# Patient Record
Sex: FEMALE | Race: WHITE | NOT HISPANIC OR LATINO | Employment: OTHER | URBAN - METROPOLITAN AREA
[De-identification: names, ages, dates, MRNs, and addresses within clinical notes are randomized per-mention and may not be internally consistent; named-entity substitution may affect disease eponyms.]

---

## 2024-06-21 ENCOUNTER — HOSPITAL ENCOUNTER (OUTPATIENT)
Dept: GENERAL RADIOLOGY | Facility: HOSPITAL | Age: 79
Discharge: HOME | End: 2024-06-21
Payer: MEDICARE

## 2024-06-21 ENCOUNTER — PRE-ADMISSION TESTING (OUTPATIENT)
Dept: PREADMISSION TESTING | Facility: HOSPITAL | Age: 79
End: 2024-06-21
Payer: MEDICARE

## 2024-06-21 VITALS
HEIGHT: 67 IN | HEART RATE: 79 BPM | WEIGHT: 191.8 LBS | RESPIRATION RATE: 16 BRPM | BODY MASS INDEX: 30.1 KG/M2 | OXYGEN SATURATION: 94 % | TEMPERATURE: 98.2 F | SYSTOLIC BLOOD PRESSURE: 154 MMHG | DIASTOLIC BLOOD PRESSURE: 80 MMHG

## 2024-06-21 LAB
ALBUMIN SERPL-MCNC: 4.2 G/DL (ref 3.5–5.2)
ALBUMIN/GLOB SERPL: 1.4 G/DL
ALP SERPL-CCNC: 92 U/L (ref 39–117)
ALT SERPL W P-5'-P-CCNC: 16 U/L (ref 1–33)
ANION GAP SERPL CALCULATED.3IONS-SCNC: 12.8 MMOL/L (ref 5–15)
AST SERPL-CCNC: 18 U/L (ref 1–32)
BACTERIA UR QL AUTO: ABNORMAL /HPF
BILIRUB SERPL-MCNC: 0.5 MG/DL (ref 0–1.2)
BILIRUB UR QL STRIP: NEGATIVE
BUN SERPL-MCNC: 22 MG/DL (ref 8–23)
BUN/CREAT SERPL: 24.4 (ref 7–25)
CALCIUM SPEC-SCNC: 9.5 MG/DL (ref 8.6–10.5)
CHLORIDE SERPL-SCNC: 107 MMOL/L (ref 98–107)
CLARITY UR: CLEAR
CO2 SERPL-SCNC: 25.2 MMOL/L (ref 22–29)
COLOR UR: YELLOW
CREAT SERPL-MCNC: 0.9 MG/DL (ref 0.57–1)
DEPRECATED RDW RBC AUTO: 44.2 FL (ref 37–54)
EGFRCR SERPLBLD CKD-EPI 2021: 65.2 ML/MIN/1.73
ERYTHROCYTE [DISTWIDTH] IN BLOOD BY AUTOMATED COUNT: 13.9 % (ref 12.3–15.4)
GLOBULIN UR ELPH-MCNC: 3 GM/DL
GLUCOSE SERPL-MCNC: 126 MG/DL (ref 65–99)
GLUCOSE UR STRIP-MCNC: NEGATIVE MG/DL
HBA1C MFR BLD: 6.5 % (ref 4.8–5.6)
HCT VFR BLD AUTO: 37.3 % (ref 34–46.6)
HGB BLD-MCNC: 12.2 G/DL (ref 12–15.9)
HGB UR QL STRIP.AUTO: ABNORMAL
HYALINE CASTS UR QL AUTO: ABNORMAL /LPF
INR PPP: 1.07 (ref 0.9–1.1)
KETONES UR QL STRIP: NEGATIVE
LEUKOCYTE ESTERASE UR QL STRIP.AUTO: ABNORMAL
MCH RBC QN AUTO: 28.6 PG (ref 26.6–33)
MCHC RBC AUTO-ENTMCNC: 32.7 G/DL (ref 31.5–35.7)
MCV RBC AUTO: 87.6 FL (ref 79–97)
NITRITE UR QL STRIP: NEGATIVE
PH UR STRIP.AUTO: 5.5 [PH] (ref 5–8)
PLATELET # BLD AUTO: 171 10*3/MM3 (ref 140–450)
PMV BLD AUTO: 9.7 FL (ref 6–12)
POTASSIUM SERPL-SCNC: 3.6 MMOL/L (ref 3.5–5.2)
PROT SERPL-MCNC: 7.2 G/DL (ref 6–8.5)
PROT UR QL STRIP: ABNORMAL
PROTHROMBIN TIME: 14.1 SECONDS (ref 11.7–14.2)
QT INTERVAL: 402 MS
QTC INTERVAL: 415 MS
RBC # BLD AUTO: 4.26 10*6/MM3 (ref 3.77–5.28)
RBC # UR STRIP: ABNORMAL /HPF
REF LAB TEST METHOD: ABNORMAL
SODIUM SERPL-SCNC: 145 MMOL/L (ref 136–145)
SP GR UR STRIP: 1.02 (ref 1–1.03)
SQUAMOUS #/AREA URNS HPF: ABNORMAL /HPF
UROBILINOGEN UR QL STRIP: ABNORMAL
WBC # UR STRIP: ABNORMAL /HPF
WBC NRBC COR # BLD AUTO: 5.33 10*3/MM3 (ref 3.4–10.8)

## 2024-06-21 PROCEDURE — 85610 PROTHROMBIN TIME: CPT

## 2024-06-21 PROCEDURE — 80053 COMPREHEN METABOLIC PANEL: CPT

## 2024-06-21 PROCEDURE — 73502 X-RAY EXAM HIP UNI 2-3 VIEWS: CPT

## 2024-06-21 PROCEDURE — 85027 COMPLETE CBC AUTOMATED: CPT

## 2024-06-21 PROCEDURE — 71046 X-RAY EXAM CHEST 2 VIEWS: CPT

## 2024-06-21 PROCEDURE — 36415 COLL VENOUS BLD VENIPUNCTURE: CPT

## 2024-06-21 PROCEDURE — 81001 URINALYSIS AUTO W/SCOPE: CPT

## 2024-06-21 PROCEDURE — 93010 ELECTROCARDIOGRAM REPORT: CPT | Performed by: INTERNAL MEDICINE

## 2024-06-21 PROCEDURE — 83036 HEMOGLOBIN GLYCOSYLATED A1C: CPT

## 2024-06-21 PROCEDURE — 93005 ELECTROCARDIOGRAM TRACING: CPT

## 2024-06-21 RX ORDER — GABAPENTIN 300 MG/1
300 CAPSULE ORAL
Status: ON HOLD | COMMUNITY
Start: 2024-03-07 | End: 2024-06-24

## 2024-06-21 RX ORDER — HYDROCODONE BITARTRATE AND ACETAMINOPHEN 5; 325 MG/1; MG/1
1 TABLET ORAL EVERY 6 HOURS PRN
COMMUNITY
Start: 2024-06-12 | End: 2024-06-27 | Stop reason: HOSPADM

## 2024-06-21 RX ORDER — ROSUVASTATIN CALCIUM 40 MG/1
1 TABLET, COATED ORAL DAILY
COMMUNITY

## 2024-06-21 RX ORDER — IBUPROFEN 600 MG/1
TABLET ORAL
COMMUNITY

## 2024-06-21 RX ORDER — ROPINIROLE 1 MG/1
TABLET, FILM COATED ORAL
COMMUNITY

## 2024-06-21 NOTE — DISCHARGE INSTRUCTIONS
Take the following medications the morning of surgery: NONE    STOP HERBAL REMEDIES, VITAMINS, SUPPLEMENTS,  STOP NSAID MEDICATIONS SUCH AS ALEVE, ASPIRIN, IBUPROFEN, MOTRIN AS SURGEON REQUESTS.  TYLENOL IS OKAY TO TAKE.    BRING A LIST OF CURRENT MEDICATIONS    ARRIVE TO ENTRANCE C.      If you are on prescription narcotic pain medication to control your pain you may also take that medication the morning of surgery.    General Instructions:  Do not eat solid food after midnight the night before surgery.  You may drink clear liquids day of surgery but must stop at least one hour before your hospital arrival time.  It is beneficial for you to have a clear drink that contains carbohydrates the day of surgery.  We suggest a 12 to 20 ounce bottle of Gatorade or Powerade for non-diabetic patients or a 12 to 20 ounce bottle of G2 or Powerade Zero for diabetic patients. (Pediatric patients, are not advised to drink a 12 to 20 ounce carbohydrate drink)    Clear liquids are liquids you can see through.  Nothing red in color.     Plain water                               Sports drinks  Sodas                                   Gelatin (Jell-O)  Fruit juices without pulp such as white grape juice and apple juice  Popsicles that contain no fruit or yogurt  Tea or coffee (no cream or milk added)  Gatorade / Powerade  G2 / Powerade Zero    Patients who avoid smoking, chewing tobacco and alcohol for 4 weeks prior to surgery have a reduced risk of post-operative complications.  Quit smoking as many days before surgery as you can.  Do not smoke, use chewing tobacco or drink alcohol the day of surgery.   If applicable bring your C-PAP/ BI-PAP machine in with you to preop day of surgery.  Bring any papers given to you in the doctor’s office.  Wear clean comfortable clothes.  Do not wear contact lenses, false eyelashes or make-up.  Bring a case for your glasses.   Bring crutches or walker if applicable.  Remove all piercings.  Leave  jewelry and any other valuables at home.  Hair extensions with metal clips must be removed prior to surgery.  The Pre-Admission Testing nurse will instruct you to bring medications if unable to obtain an accurate list in Pre-Admission Testing.          Preventing a Surgical Site Infection:  For 2 to 3 days before surgery, avoid shaving with a razor because the razor can irritate skin and make it easier to develop an infection.    Any areas of open skin can increase the risk of a post-operative wound infection by allowing bacteria to enter and travel throughout the body.  Notify your surgeon if you have any skin wounds / rashes even if it is not near the expected surgical site.  The area will need assessed to determine if surgery should be delayed until it is healed.  The night prior to surgery shower using a fresh bar of anti-bacterial soap (such as Dial) and clean washcloth.  Sleep in a clean bed with clean clothing.  Do not allow pets to sleep with you.  Shower on the morning of surgery using a fresh bar of anti-bacterial soap (such as Dial) and clean washcloth.  Dry with a clean towel and dress in clean clothing.    CHLORHEXIDINE CLOTH INSTRUCTIONS  The morning of surgery follow these instructions using the Chlorhexidine cloths you've been given.  These steps reduce bacteria on the body.  Do not use the cloths near your eyes, ears mouth, genitalia or on open wounds.  Throw the cloths away after use but do not try to flush them down a toilet.      Open and remove one cloth at a time from the package.    Leave the cloth unfolded and begin the bathing.  Massage the skin with the cloths using gentle pressure to remove bacteria.  Do not scrub harshly.   Follow the steps below with one 2% CHG cloth per area (6 total cloths).  One cloth for neck, shoulders and chest.  One cloth for both arms, hands, fingers and underarms (do underarms last).  One cloth for the abdomen followed by groin.  One cloth for right leg and foot  including between the toes.  One cloth for left leg and foot including between the toes.  The last cloth is to be used for the back of the neck, back and buttocks.    Allow the CHG to air dry 3 minutes on the skin which will give it time to work and decrease the chance of irritation.  The skin may feel sticky until it is dry.  Do not rinse with water or any other liquid or you will lose the beneficial effects of the CHG.  If mild skin irritation occurs, do rinse the skin to remove the CHG.  Report this to the nurse at time of admission.  Do not apply lotions, creams, ointments, deodorants or perfumes after using the clothes. Dress in clean clothes before coming to the hospital.  Ask your surgeon if you will be receiving antibiotics prior to surgery.  Make sure you, your family, and all healthcare providers clean their hands with soap and water or an alcohol based hand  before caring for you or your wound.    Day of surgery:  Your arrival time is approximately two hours before your scheduled surgery time.  Upon arrival, a Pre-op nurse and Anesthesiologist will review your health history, obtain vital signs, and answer questions you may have.  The only belongings needed at this time will be a list of your home medications and if applicable your C-PAP/BI-PAP machine.  A Pre-op nurse will start an IV and you may receive medication in preparation for surgery, including something to help you relax.     Please be aware that surgery does come with discomfort.  We want to make every effort to control your discomfort so please discuss any uncontrolled symptoms with your nurse.   Your doctor will most likely have prescribed pain medications.      If you are going home after surgery you will receive individualized written care instructions before being discharged.  A responsible adult must drive you to and from the hospital on the day of your surgery and ideally stay with you through the night.   .  Discharge  prescriptions can be filled by the hospital pharmacy during regular pharmacy hours.  If you are having surgery late in the day/evening your prescription may be e-prescribed to your pharmacy.  Please verify your pharmacy hours or chose a 24 hour pharmacy to avoid not having access to your prescription because your pharmacy has closed for the day.    If you are staying overnight following surgery, you will be transported to your hospital room following the recovery period.  [facility] has all private rooms.    If you have any questions please call Pre-Admission Testing at [phone].  Deductibles and co-payments are collected on the day of service. Please be prepared to pay the required co-pay, deductible or deposit on the day of service as defined by your plan.    Call your surgeon immediately if you experience any of the following symptoms:  Sore Throat  Shortness of Breath or difficulty breathing  Cough  Chills  Body soreness or muscle pain  Headache  Fever  New loss of taste or smell  Do not arrive for your surgery ill.  Your procedure will need to be rescheduled to another time.  You will need to call your physician before the day of surgery to avoid any unnecessary exposure to hospital staff as well as other patients.

## 2024-06-24 ENCOUNTER — APPOINTMENT (OUTPATIENT)
Dept: GENERAL RADIOLOGY | Facility: HOSPITAL | Age: 79
End: 2024-06-24
Payer: MEDICARE

## 2024-06-24 ENCOUNTER — ANESTHESIA EVENT (OUTPATIENT)
Dept: PERIOP | Facility: HOSPITAL | Age: 79
End: 2024-06-24
Payer: MEDICARE

## 2024-06-24 ENCOUNTER — HOSPITAL ENCOUNTER (OUTPATIENT)
Facility: HOSPITAL | Age: 79
Setting detail: OBSERVATION
Discharge: HOME | End: 2024-06-27
Attending: ORTHOPAEDIC SURGERY | Admitting: ORTHOPAEDIC SURGERY
Payer: MEDICARE

## 2024-06-24 ENCOUNTER — ANESTHESIA (OUTPATIENT)
Dept: PERIOP | Facility: HOSPITAL | Age: 79
End: 2024-06-24
Payer: MEDICARE

## 2024-06-24 DIAGNOSIS — Z96.641 STATUS POST RIGHT HIP REPLACEMENT: Primary | ICD-10-CM

## 2024-06-24 PROBLEM — Z96.649 HIP JOINT REPLACEMENT STATUS: Status: ACTIVE | Noted: 2024-06-24

## 2024-06-24 LAB
ANION GAP SERPL CALCULATED.3IONS-SCNC: 13.8 MMOL/L (ref 5–15)
BUN SERPL-MCNC: 21 MG/DL (ref 8–23)
BUN/CREAT SERPL: 25.3 (ref 7–25)
CALCIUM SPEC-SCNC: 8.9 MG/DL (ref 8.6–10.5)
CHLORIDE SERPL-SCNC: 104 MMOL/L (ref 98–107)
CO2 SERPL-SCNC: 22.2 MMOL/L (ref 22–29)
CREAT SERPL-MCNC: 0.83 MG/DL (ref 0.57–1)
EGFRCR SERPLBLD CKD-EPI 2021: 71.8 ML/MIN/1.73
GLUCOSE SERPL-MCNC: 156 MG/DL (ref 65–99)
HCT VFR BLD AUTO: 34.4 % (ref 34–46.6)
HGB BLD-MCNC: 11.2 G/DL (ref 12–15.9)
POTASSIUM SERPL-SCNC: 4 MMOL/L (ref 3.5–5.2)
SODIUM SERPL-SCNC: 140 MMOL/L (ref 136–145)

## 2024-06-24 PROCEDURE — 25010000002 DEXAMETHASONE SODIUM PHOSPHATE 20 MG/5ML SOLUTION: Performed by: NURSE ANESTHETIST, CERTIFIED REGISTERED

## 2024-06-24 PROCEDURE — 25010000002 VANCOMYCIN 1 G RECONSTITUTED SOLUTION: Performed by: ORTHOPAEDIC SURGERY

## 2024-06-24 PROCEDURE — 25010000002 CLONIDINE PER 1 MG: Performed by: ORTHOPAEDIC SURGERY

## 2024-06-24 PROCEDURE — 25010000002 PROPOFOL 200 MG/20ML EMULSION: Performed by: NURSE ANESTHETIST, CERTIFIED REGISTERED

## 2024-06-24 PROCEDURE — A9270 NON-COVERED ITEM OR SERVICE: HCPCS | Performed by: ORTHOPAEDIC SURGERY

## 2024-06-24 PROCEDURE — 25810000003 LACTATED RINGERS PER 1000 ML: Performed by: ANESTHESIOLOGY

## 2024-06-24 PROCEDURE — C1776 JOINT DEVICE (IMPLANTABLE): HCPCS | Performed by: ORTHOPAEDIC SURGERY

## 2024-06-24 PROCEDURE — 25010000002 EPINEPHRINE 1 MG/ML SOLUTION 30 ML VIAL: Performed by: ORTHOPAEDIC SURGERY

## 2024-06-24 PROCEDURE — G0378 HOSPITAL OBSERVATION PER HR: HCPCS

## 2024-06-24 PROCEDURE — 25010000002 FENTANYL CITRATE (PF) 100 MCG/2ML SOLUTION: Performed by: NURSE ANESTHETIST, CERTIFIED REGISTERED

## 2024-06-24 PROCEDURE — 25010000002 ESMOLOL 100 MG/10ML SOLUTION: Performed by: NURSE ANESTHETIST, CERTIFIED REGISTERED

## 2024-06-24 PROCEDURE — 72170 X-RAY EXAM OF PELVIS: CPT

## 2024-06-24 PROCEDURE — 25010000002 ROPIVACAINE PER 1 MG: Performed by: ORTHOPAEDIC SURGERY

## 2024-06-24 PROCEDURE — 25010000002 ONDANSETRON PER 1 MG: Performed by: NURSE ANESTHETIST, CERTIFIED REGISTERED

## 2024-06-24 PROCEDURE — 25010000002 HYDROMORPHONE PER 4 MG: Performed by: NURSE ANESTHETIST, CERTIFIED REGISTERED

## 2024-06-24 PROCEDURE — 85014 HEMATOCRIT: CPT | Performed by: ORTHOPAEDIC SURGERY

## 2024-06-24 PROCEDURE — 80048 BASIC METABOLIC PNL TOTAL CA: CPT | Performed by: ORTHOPAEDIC SURGERY

## 2024-06-24 PROCEDURE — 25010000002 SUGAMMADEX 200 MG/2ML SOLUTION: Performed by: NURSE ANESTHETIST, CERTIFIED REGISTERED

## 2024-06-24 PROCEDURE — 25010000002 KETOROLAC TROMETHAMINE PER 15 MG: Performed by: ORTHOPAEDIC SURGERY

## 2024-06-24 PROCEDURE — 85018 HEMOGLOBIN: CPT | Performed by: ORTHOPAEDIC SURGERY

## 2024-06-24 PROCEDURE — 25010000002 MAGNESIUM SULFATE PER 500 MG OF MAGNESIUM: Performed by: NURSE ANESTHETIST, CERTIFIED REGISTERED

## 2024-06-24 PROCEDURE — 63710000001 ASPIRIN 81 MG TABLET DELAYED-RELEASE: Performed by: ORTHOPAEDIC SURGERY

## 2024-06-24 PROCEDURE — C1713 ANCHOR/SCREW BN/BN,TIS/BN: HCPCS | Performed by: ORTHOPAEDIC SURGERY

## 2024-06-24 PROCEDURE — 76000 FLUOROSCOPY <1 HR PHYS/QHP: CPT

## 2024-06-24 PROCEDURE — 25010000002 CEFAZOLIN PER 500 MG: Performed by: ORTHOPAEDIC SURGERY

## 2024-06-24 DEVICE — PALACOS® R IS A FAST-CURING, RADIOPAQUE, POLY(METHYL METHACRYLATE)-BASED BONE CEMENT.PALACOS ® R CONTAINS THE X-RAY CONTRAST MEDIUM ZIRCONIUM DIOXIDE. TO IMPROVE VISIBILITY IN THE SURGICAL FIELD PALACOS ® R HAS BEEN COLOURED WITH CHLOROPHYLL (E141). THE BONE CEMENT IS PREPARED DIRECTLY BEFORE USE BY MIXING A POLYMER POWDER COMPONENT WITH A LIQUID MONOMER COMPONENT. A DUCTILE DOUGH FORMS WHICH CURES WITHIN A FEW MINUTES.
Type: IMPLANTABLE DEVICE | Site: HIP | Status: FUNCTIONAL
Brand: PALACOS®

## 2024-06-24 DEVICE — R3 0 DEGREE XLPE ACETABULAR LINER                                    32MM ID X OD 50MM
Type: IMPLANTABLE DEVICE | Site: HIP | Status: FUNCTIONAL
Brand: R3

## 2024-06-24 DEVICE — IMPLANTABLE DEVICE: Type: IMPLANTABLE DEVICE | Status: FUNCTIONAL

## 2024-06-24 DEVICE — OXINIUM FEMORAL HEAD 12/14 TAPER                                    32MM +0
Type: IMPLANTABLE DEVICE | Site: HIP | Status: FUNCTIONAL
Brand: OXINIUM

## 2024-06-24 DEVICE — DEV CONTRL TISS STRATAFIX SPIRAL MNCRYL UD 3/0 PLS 30CM: Type: IMPLANTABLE DEVICE | Site: HIP | Status: FUNCTIONAL

## 2024-06-24 DEVICE — R3 3 HOLE ACETABULAR SHELL 50MM
Type: IMPLANTABLE DEVICE | Site: HIP | Status: FUNCTIONAL
Brand: R3 ACETABULAR

## 2024-06-24 DEVICE — POLARSTEM STANDARD CEMENTED 2
Type: IMPLANTABLE DEVICE | Site: HIP | Status: FUNCTIONAL
Brand: POLARSTEM

## 2024-06-24 RX ORDER — DROPERIDOL 2.5 MG/ML
0.62 INJECTION, SOLUTION INTRAMUSCULAR; INTRAVENOUS
Status: DISCONTINUED | OUTPATIENT
Start: 2024-06-24 | End: 2024-06-24 | Stop reason: HOSPADM

## 2024-06-24 RX ORDER — FENTANYL CITRATE 50 UG/ML
25 INJECTION, SOLUTION INTRAMUSCULAR; INTRAVENOUS
Status: DISCONTINUED | OUTPATIENT
Start: 2024-06-24 | End: 2024-06-24 | Stop reason: HOSPADM

## 2024-06-24 RX ORDER — ROPINIROLE 1 MG/1
1 TABLET, FILM COATED ORAL DAILY
Status: DISCONTINUED | OUTPATIENT
Start: 2024-06-24 | End: 2024-06-27 | Stop reason: HOSPADM

## 2024-06-24 RX ORDER — FENTANYL CITRATE 50 UG/ML
INJECTION, SOLUTION INTRAMUSCULAR; INTRAVENOUS AS NEEDED
Status: DISCONTINUED | OUTPATIENT
Start: 2024-06-24 | End: 2024-06-24 | Stop reason: SURG

## 2024-06-24 RX ORDER — FAMOTIDINE 10 MG/ML
20 INJECTION, SOLUTION INTRAVENOUS ONCE
Status: COMPLETED | OUTPATIENT
Start: 2024-06-24 | End: 2024-06-24

## 2024-06-24 RX ORDER — DEXAMETHASONE SODIUM PHOSPHATE 4 MG/ML
INJECTION, SOLUTION INTRA-ARTICULAR; INTRALESIONAL; INTRAMUSCULAR; INTRAVENOUS; SOFT TISSUE AS NEEDED
Status: DISCONTINUED | OUTPATIENT
Start: 2024-06-24 | End: 2024-06-24 | Stop reason: SURG

## 2024-06-24 RX ORDER — CELECOXIB 200 MG/1
200 CAPSULE ORAL ONCE
Status: COMPLETED | OUTPATIENT
Start: 2024-06-24 | End: 2024-06-24

## 2024-06-24 RX ORDER — HYDROMORPHONE HYDROCHLORIDE 1 MG/ML
0.25 INJECTION, SOLUTION INTRAMUSCULAR; INTRAVENOUS; SUBCUTANEOUS
Status: DISCONTINUED | OUTPATIENT
Start: 2024-06-24 | End: 2024-06-24 | Stop reason: HOSPADM

## 2024-06-24 RX ORDER — HYDRALAZINE HYDROCHLORIDE 20 MG/ML
5 INJECTION INTRAMUSCULAR; INTRAVENOUS
Status: DISCONTINUED | OUTPATIENT
Start: 2024-06-24 | End: 2024-06-24 | Stop reason: HOSPADM

## 2024-06-24 RX ORDER — ONDANSETRON 2 MG/ML
4 INJECTION INTRAMUSCULAR; INTRAVENOUS ONCE AS NEEDED
Status: DISCONTINUED | OUTPATIENT
Start: 2024-06-24 | End: 2024-06-24 | Stop reason: HOSPADM

## 2024-06-24 RX ORDER — MAGNESIUM SULFATE HEPTAHYDRATE 500 MG/ML
INJECTION, SOLUTION INTRAMUSCULAR; INTRAVENOUS AS NEEDED
Status: DISCONTINUED | OUTPATIENT
Start: 2024-06-24 | End: 2024-06-24 | Stop reason: SURG

## 2024-06-24 RX ORDER — PREGABALIN 75 MG/1
150 CAPSULE ORAL ONCE
Status: COMPLETED | OUTPATIENT
Start: 2024-06-24 | End: 2024-06-24

## 2024-06-24 RX ORDER — LIDOCAINE HYDROCHLORIDE 20 MG/ML
INJECTION, SOLUTION EPIDURAL; INFILTRATION; INTRACAUDAL; PERINEURAL AS NEEDED
Status: DISCONTINUED | OUTPATIENT
Start: 2024-06-24 | End: 2024-06-24 | Stop reason: SURG

## 2024-06-24 RX ORDER — SODIUM CHLORIDE, SODIUM LACTATE, POTASSIUM CHLORIDE, CALCIUM CHLORIDE 600; 310; 30; 20 MG/100ML; MG/100ML; MG/100ML; MG/100ML
9 INJECTION, SOLUTION INTRAVENOUS CONTINUOUS
Status: DISCONTINUED | OUTPATIENT
Start: 2024-06-24 | End: 2024-06-26

## 2024-06-24 RX ORDER — DIPHENHYDRAMINE HYDROCHLORIDE 50 MG/ML
12.5 INJECTION INTRAMUSCULAR; INTRAVENOUS
Status: DISCONTINUED | OUTPATIENT
Start: 2024-06-24 | End: 2024-06-24 | Stop reason: HOSPADM

## 2024-06-24 RX ORDER — SODIUM CHLORIDE 9 MG/ML
100 INJECTION, SOLUTION INTRAVENOUS CONTINUOUS
Status: DISCONTINUED | OUTPATIENT
Start: 2024-06-24 | End: 2024-06-26

## 2024-06-24 RX ORDER — TRANEXAMIC ACID 100 MG/ML
INJECTION, SOLUTION INTRAVENOUS AS NEEDED
Status: DISCONTINUED | OUTPATIENT
Start: 2024-06-24 | End: 2024-06-24 | Stop reason: SURG

## 2024-06-24 RX ORDER — ASPIRIN 81 MG/1
81 TABLET ORAL EVERY 12 HOURS SCHEDULED
Status: DISCONTINUED | OUTPATIENT
Start: 2024-06-24 | End: 2024-06-27 | Stop reason: HOSPADM

## 2024-06-24 RX ORDER — PHENYLEPHRINE HCL IN 0.9% NACL 1 MG/10 ML
SYRINGE (ML) INTRAVENOUS AS NEEDED
Status: DISCONTINUED | OUTPATIENT
Start: 2024-06-24 | End: 2024-06-24 | Stop reason: SURG

## 2024-06-24 RX ORDER — ONDANSETRON 4 MG/1
4 TABLET, ORALLY DISINTEGRATING ORAL EVERY 6 HOURS PRN
Status: DISCONTINUED | OUTPATIENT
Start: 2024-06-24 | End: 2024-06-27 | Stop reason: HOSPADM

## 2024-06-24 RX ORDER — ROCURONIUM BROMIDE 10 MG/ML
INJECTION, SOLUTION INTRAVENOUS AS NEEDED
Status: DISCONTINUED | OUTPATIENT
Start: 2024-06-24 | End: 2024-06-24 | Stop reason: SURG

## 2024-06-24 RX ORDER — PROPOFOL 10 MG/ML
INJECTION, EMULSION INTRAVENOUS AS NEEDED
Status: DISCONTINUED | OUTPATIENT
Start: 2024-06-24 | End: 2024-06-24 | Stop reason: SURG

## 2024-06-24 RX ORDER — IPRATROPIUM BROMIDE AND ALBUTEROL SULFATE 2.5; .5 MG/3ML; MG/3ML
3 SOLUTION RESPIRATORY (INHALATION) ONCE AS NEEDED
Status: DISCONTINUED | OUTPATIENT
Start: 2024-06-24 | End: 2024-06-24 | Stop reason: HOSPADM

## 2024-06-24 RX ORDER — ACETAMINOPHEN 500 MG
1000 TABLET ORAL ONCE
Status: DISCONTINUED | OUTPATIENT
Start: 2024-06-24 | End: 2024-06-24 | Stop reason: HOSPADM

## 2024-06-24 RX ORDER — FENTANYL CITRATE 50 UG/ML
50 INJECTION, SOLUTION INTRAMUSCULAR; INTRAVENOUS ONCE AS NEEDED
Status: DISCONTINUED | OUTPATIENT
Start: 2024-06-24 | End: 2024-06-24 | Stop reason: HOSPADM

## 2024-06-24 RX ORDER — ROPINIROLE 2 MG/1
2 TABLET, FILM COATED ORAL NIGHTLY
Status: DISCONTINUED | OUTPATIENT
Start: 2024-06-24 | End: 2024-06-27 | Stop reason: HOSPADM

## 2024-06-24 RX ORDER — MELOXICAM 15 MG/1
15 TABLET ORAL DAILY
Status: DISCONTINUED | OUTPATIENT
Start: 2024-06-24 | End: 2024-06-26

## 2024-06-24 RX ORDER — VANCOMYCIN HYDROCHLORIDE 1 G/20ML
INJECTION, POWDER, LYOPHILIZED, FOR SOLUTION INTRAVENOUS AS NEEDED
Status: DISCONTINUED | OUTPATIENT
Start: 2024-06-24 | End: 2024-06-24 | Stop reason: HOSPADM

## 2024-06-24 RX ORDER — FLUMAZENIL 0.1 MG/ML
0.2 INJECTION INTRAVENOUS AS NEEDED
Status: DISCONTINUED | OUTPATIENT
Start: 2024-06-24 | End: 2024-06-24 | Stop reason: HOSPADM

## 2024-06-24 RX ORDER — EPHEDRINE SULFATE 50 MG/ML
5 INJECTION, SOLUTION INTRAVENOUS ONCE AS NEEDED
Status: DISCONTINUED | OUTPATIENT
Start: 2024-06-24 | End: 2024-06-24 | Stop reason: HOSPADM

## 2024-06-24 RX ORDER — DOCUSATE SODIUM 100 MG/1
100 CAPSULE, LIQUID FILLED ORAL 2 TIMES DAILY PRN
Status: DISCONTINUED | OUTPATIENT
Start: 2024-06-24 | End: 2024-06-27 | Stop reason: HOSPADM

## 2024-06-24 RX ORDER — HYDROCODONE BITARTRATE AND ACETAMINOPHEN 7.5; 325 MG/1; MG/1
1 TABLET ORAL EVERY 4 HOURS PRN
Status: DISCONTINUED | OUTPATIENT
Start: 2024-06-24 | End: 2024-06-24 | Stop reason: HOSPADM

## 2024-06-24 RX ORDER — SODIUM CHLORIDE 0.9 % (FLUSH) 0.9 %
3 SYRINGE (ML) INJECTION EVERY 12 HOURS SCHEDULED
Status: DISCONTINUED | OUTPATIENT
Start: 2024-06-24 | End: 2024-06-24 | Stop reason: HOSPADM

## 2024-06-24 RX ORDER — ONDANSETRON 4 MG/1
4 TABLET, FILM COATED ORAL EVERY 6 HOURS PRN
Qty: 30 TABLET | Refills: 0 | Status: SHIPPED | OUTPATIENT
Start: 2024-06-24

## 2024-06-24 RX ORDER — NALOXONE HCL 0.4 MG/ML
0.2 VIAL (ML) INJECTION AS NEEDED
Status: DISCONTINUED | OUTPATIENT
Start: 2024-06-24 | End: 2024-06-24 | Stop reason: HOSPADM

## 2024-06-24 RX ORDER — OXYCODONE HYDROCHLORIDE AND ACETAMINOPHEN 5; 325 MG/1; MG/1
1 TABLET ORAL EVERY 4 HOURS PRN
Status: DISCONTINUED | OUTPATIENT
Start: 2024-06-24 | End: 2024-06-26

## 2024-06-24 RX ORDER — ONDANSETRON 2 MG/ML
INJECTION INTRAMUSCULAR; INTRAVENOUS AS NEEDED
Status: DISCONTINUED | OUTPATIENT
Start: 2024-06-24 | End: 2024-06-24 | Stop reason: SURG

## 2024-06-24 RX ORDER — LABETALOL HYDROCHLORIDE 5 MG/ML
5 INJECTION, SOLUTION INTRAVENOUS
Status: DISCONTINUED | OUTPATIENT
Start: 2024-06-24 | End: 2024-06-24 | Stop reason: HOSPADM

## 2024-06-24 RX ORDER — LIDOCAINE HYDROCHLORIDE 10 MG/ML
0.5 INJECTION, SOLUTION INFILTRATION; PERINEURAL ONCE AS NEEDED
Status: DISCONTINUED | OUTPATIENT
Start: 2024-06-24 | End: 2024-06-24 | Stop reason: HOSPADM

## 2024-06-24 RX ORDER — MIDAZOLAM HYDROCHLORIDE 1 MG/ML
0.5 INJECTION INTRAMUSCULAR; INTRAVENOUS
Status: DISCONTINUED | OUTPATIENT
Start: 2024-06-24 | End: 2024-06-24 | Stop reason: HOSPADM

## 2024-06-24 RX ORDER — ESMOLOL HYDROCHLORIDE 10 MG/ML
INJECTION INTRAVENOUS AS NEEDED
Status: DISCONTINUED | OUTPATIENT
Start: 2024-06-24 | End: 2024-06-24 | Stop reason: SURG

## 2024-06-24 RX ORDER — PROMETHAZINE HYDROCHLORIDE 25 MG/1
25 SUPPOSITORY RECTAL ONCE AS NEEDED
Status: DISCONTINUED | OUTPATIENT
Start: 2024-06-24 | End: 2024-06-24 | Stop reason: HOSPADM

## 2024-06-24 RX ORDER — ACETAMINOPHEN 500 MG
1000 TABLET ORAL ONCE
Status: COMPLETED | OUTPATIENT
Start: 2024-06-24 | End: 2024-06-24

## 2024-06-24 RX ORDER — ASPIRIN 81 MG/1
81 TABLET ORAL EVERY 12 HOURS
Qty: 60 TABLET | Refills: 0 | Status: SHIPPED | OUTPATIENT
Start: 2024-06-24 | End: 2024-07-27

## 2024-06-24 RX ORDER — SODIUM CHLORIDE 0.9 % (FLUSH) 0.9 %
3-10 SYRINGE (ML) INJECTION AS NEEDED
Status: DISCONTINUED | OUTPATIENT
Start: 2024-06-24 | End: 2024-06-24 | Stop reason: HOSPADM

## 2024-06-24 RX ORDER — NALOXONE HCL 0.4 MG/ML
0.1 VIAL (ML) INJECTION
Status: DISCONTINUED | OUTPATIENT
Start: 2024-06-24 | End: 2024-06-26

## 2024-06-24 RX ORDER — OXYCODONE HYDROCHLORIDE AND ACETAMINOPHEN 5; 325 MG/1; MG/1
1 TABLET ORAL EVERY 4 HOURS PRN
Qty: 30 TABLET | Refills: 0 | Status: SHIPPED | OUTPATIENT
Start: 2024-06-24 | End: 2024-06-27 | Stop reason: HOSPADM

## 2024-06-24 RX ORDER — PROMETHAZINE HYDROCHLORIDE 25 MG/1
25 TABLET ORAL ONCE AS NEEDED
Status: DISCONTINUED | OUTPATIENT
Start: 2024-06-24 | End: 2024-06-24 | Stop reason: HOSPADM

## 2024-06-24 RX ORDER — FAMOTIDINE 20 MG/1
40 TABLET, FILM COATED ORAL DAILY
Status: DISCONTINUED | OUTPATIENT
Start: 2024-06-24 | End: 2024-06-27 | Stop reason: HOSPADM

## 2024-06-24 RX ORDER — OXYCODONE HYDROCHLORIDE AND ACETAMINOPHEN 5; 325 MG/1; MG/1
2 TABLET ORAL EVERY 4 HOURS PRN
Status: DISCONTINUED | OUTPATIENT
Start: 2024-06-24 | End: 2024-06-26

## 2024-06-24 RX ORDER — ONDANSETRON 2 MG/ML
4 INJECTION INTRAMUSCULAR; INTRAVENOUS EVERY 6 HOURS PRN
Status: DISCONTINUED | OUTPATIENT
Start: 2024-06-24 | End: 2024-06-27 | Stop reason: HOSPADM

## 2024-06-24 RX ORDER — HYDROCODONE BITARTRATE AND ACETAMINOPHEN 5; 325 MG/1; MG/1
1 TABLET ORAL ONCE AS NEEDED
Status: DISCONTINUED | OUTPATIENT
Start: 2024-06-24 | End: 2024-06-24 | Stop reason: HOSPADM

## 2024-06-24 RX ADMIN — ASPIRIN 81 MG: 81 TABLET, COATED ORAL at 21:27

## 2024-06-24 RX ADMIN — SODIUM CHLORIDE 100 ML/HR: 9 INJECTION, SOLUTION INTRAVENOUS at 18:31

## 2024-06-24 RX ADMIN — HYDROMORPHONE HYDROCHLORIDE 0.25 MG: 1 INJECTION, SOLUTION INTRAMUSCULAR; INTRAVENOUS; SUBCUTANEOUS at 12:22

## 2024-06-24 RX ADMIN — PREGABALIN 150 MG: 75 CAPSULE ORAL at 08:43

## 2024-06-24 RX ADMIN — CELECOXIB 200 MG: 200 CAPSULE ORAL at 08:43

## 2024-06-24 RX ADMIN — Medication 100 MCG: at 11:28

## 2024-06-24 RX ADMIN — TRANEXAMIC ACID 1000 MG: 100 INJECTION, SOLUTION INTRAVENOUS at 10:23

## 2024-06-24 RX ADMIN — FAMOTIDINE 20 MG: 10 INJECTION INTRAVENOUS at 08:49

## 2024-06-24 RX ADMIN — HYDROMORPHONE HYDROCHLORIDE 0.25 MG: 1 INJECTION, SOLUTION INTRAMUSCULAR; INTRAVENOUS; SUBCUTANEOUS at 12:04

## 2024-06-24 RX ADMIN — FENTANYL CITRATE 50 MCG: 50 INJECTION, SOLUTION INTRAMUSCULAR; INTRAVENOUS at 10:37

## 2024-06-24 RX ADMIN — LIDOCAINE HYDROCHLORIDE 100 MG: 20 INJECTION, SOLUTION EPIDURAL; INFILTRATION; INTRACAUDAL; PERINEURAL at 10:13

## 2024-06-24 RX ADMIN — TRANEXAMIC ACID 1000 MG: 100 INJECTION, SOLUTION INTRAVENOUS at 11:36

## 2024-06-24 RX ADMIN — HYDROMORPHONE HYDROCHLORIDE 0.25 MG: 1 INJECTION, SOLUTION INTRAMUSCULAR; INTRAVENOUS; SUBCUTANEOUS at 12:11

## 2024-06-24 RX ADMIN — DEXAMETHASONE SODIUM PHOSPHATE 8 MG: 4 INJECTION, SOLUTION INTRAMUSCULAR; INTRAVENOUS at 10:25

## 2024-06-24 RX ADMIN — PROPOFOL 80 MG: 10 INJECTION, EMULSION INTRAVENOUS at 10:13

## 2024-06-24 RX ADMIN — ROCURONIUM BROMIDE 50 MG: 10 INJECTION, SOLUTION INTRAVENOUS at 10:13

## 2024-06-24 RX ADMIN — ACETAMINOPHEN 1000 MG: 500 TABLET ORAL at 08:43

## 2024-06-24 RX ADMIN — HYDROMORPHONE HYDROCHLORIDE 0.25 MG: 1 INJECTION, SOLUTION INTRAMUSCULAR; INTRAVENOUS; SUBCUTANEOUS at 12:30

## 2024-06-24 RX ADMIN — PROPOFOL 80 MG: 10 INJECTION, EMULSION INTRAVENOUS at 11:44

## 2024-06-24 RX ADMIN — SUGAMMADEX 200 MG: 100 INJECTION, SOLUTION INTRAVENOUS at 11:41

## 2024-06-24 RX ADMIN — Medication 100 MCG: at 10:29

## 2024-06-24 RX ADMIN — SODIUM CHLORIDE 2000 MG: 900 INJECTION INTRAVENOUS at 10:02

## 2024-06-24 RX ADMIN — SODIUM CHLORIDE, POTASSIUM CHLORIDE, SODIUM LACTATE AND CALCIUM CHLORIDE: 600; 310; 30; 20 INJECTION, SOLUTION INTRAVENOUS at 10:11

## 2024-06-24 RX ADMIN — PROPOFOL 40 MG: 10 INJECTION, EMULSION INTRAVENOUS at 10:14

## 2024-06-24 RX ADMIN — MAGNESIUM SULFATE HEPTAHYDRATE 2 G: 500 INJECTION, SOLUTION INTRAMUSCULAR; INTRAVENOUS at 10:34

## 2024-06-24 RX ADMIN — SODIUM CHLORIDE 2000 MG: 900 INJECTION INTRAVENOUS at 18:29

## 2024-06-24 RX ADMIN — FENTANYL CITRATE 50 MCG: 50 INJECTION, SOLUTION INTRAMUSCULAR; INTRAVENOUS at 11:44

## 2024-06-24 RX ADMIN — HYDROCODONE BITARTRATE AND ACETAMINOPHEN 1 TABLET: 7.5; 325 TABLET ORAL at 12:34

## 2024-06-24 RX ADMIN — ONDANSETRON 4 MG: 2 INJECTION INTRAMUSCULAR; INTRAVENOUS at 11:33

## 2024-06-24 RX ADMIN — ESMOLOL HYDROCHLORIDE 50 MG: 10 INJECTION, SOLUTION INTRAVENOUS at 10:13

## 2024-06-24 NOTE — SIGNIFICANT NOTE
06/24/24 1538   OTHER   Discipline physical therapist   Rehab Time/Intention   Session Not Performed other (see comments)  (Aware of PT consult. Patient very nauseous upon PT arrival and declined PT evaluation this afternoon. RODRIGO Montes De Oca aware. Notified both patient and RN PT will follow up tomorrow AM.)   Recommendation   PT - Next Appointment 06/25/24

## 2024-06-24 NOTE — DISCHARGE PLACEMENT REQUEST
"FREDY Duque \"SHELBIE\" (79 y.o. Female)       Date of Birth   1945    Social Security Number       Address   [address] [place] [address]    Home Phone   [phone]    MRN   [id number]       Cheondoism   Hindu    Marital Status                               Admission Date   6/24/24    Admission Type   Elective    Admitting Provider   Sheng Zamora II, MD    Attending Provider   Sheng Zamora II, MD    Department, Room/Bed   [address], P876/1       Discharge Date       Discharge Disposition       Discharge Destination                                 Attending Provider: Sheng Zamora II, MD    Allergies: No Known Allergies    Isolation: None   Infection: None   Code Status: Not on file    Ht: 168.9 cm (66.5\")   Wt: 91.1 kg (200 lb 13.4 oz)    Admission Cmt: None   Principal Problem: Hip joint replacement status [Z96.649]                   Active Insurance as of 6/24/2024       Primary Coverage       Payor Plan Insurance Group Employer/Plan Group    HUMANA MEDICARE REPLACEMENT HUMANA MED ADV GROUP [id number]       Payor Plan Address Payor Plan Phone Number Payor Plan Fax Number Effective Dates    [address] [address] [phone]  1/1/2018 - None Entered    [facility] [id number]         Subscriber Name Subscriber Birth Date Member ID       FREDY DUQUE 1945 [id number]                     Emergency Contacts        (Rel.) Home Phone Work Phone Mobile Phone    Chavez,John (Friend) -- -- [phone]    Seven Duque (Spouse) [phone] -- [phone]    Calvin Romo (Son) [phone] -- [phone]                "

## 2024-06-24 NOTE — PLAN OF CARE
Goal Outcome Evaluation:  Plan of Care Reviewed With: (P) patient, spouse         Pt post op R anterior hip with Dr. Zamora. Hx of restless leg syndrome and high cholesterol. PIV in L AC with scheduled cefazolin. Vitals WDL and pain managed. Pt has been drowsy and nauseas, letting pt rest and minimizing triggers of nausea. Pt on 2L of O2, jaclyn dressing and dtv at 1950.

## 2024-06-24 NOTE — CASE MANAGEMENT/SOCIAL WORK
Discharge Planning Assessment  [facility]     Patient Name: FREDY Rivera  MRN: [id number]  Today's Date: 6/24/2024    Admit Date: 6/24/2024    Plan: Home with significant other and HH   Discharge Needs Assessment       Row Name 06/24/24 1708       Living Environment    People in Home significant other    Name(s) of People in Home Lanre    Current Living Arrangements home    Potentially Unsafe Housing Conditions none    Primary Care Provided by self    Family Caregiver if Needed significant other    Quality of Family Relationships involved;helpful    Able to Return to Prior Arrangements yes       Resource/Environmental Concerns    Resource/Environmental Concerns none       Transition Planning    Patient/Family Anticipates Transition to home with family;home with help/services    Patient/Family Anticipated Services at Transition home health care    Transportation Anticipated family or friend will provide       Discharge Needs Assessment    Readmission Within the Last 30 Days no previous admission in last 30 days    Equipment Currently Used at Home cane, straight;walker, standard    Concerns to be Addressed adjustment to diagnosis/illness    Anticipated Changes Related to Illness none    Equipment Needed After Discharge none                   Discharge Plan       Row Name 06/24/24 7245       Plan    Plan Home with significant other and HH    Patient/Family in Agreement with Plan yes    Plan Comments Spoke with pt significant other Lanre at bedside, introduced self and explained CCP role, and confirmed pharmacy and face sheet information verified. Pt lives with Lanre in 1 level home with 3 JAZZY, she is normally IADL's, has standard walker, g/b, no HH or SNF history. She plans home with HH, no preference for agency, unsure if this was set up by ortho office, no documentation found. Referral sent to [facility], pt plans home with Lanre to transport and assist at home. Lanre states PT recommended RWX but he states he is  afraid it will get out from under her, asked PT to see if st. walker ok.   CCP will follow [facility]                  Continued Care and Services - Admitted Since 6/24/2024       Home Medical Care       Service Provider Request Status Selected Services Address Phone Fax Patient Preferred    [facility] Pending - Request Sent N/A [address] [id number] [phone] --                  Expected Discharge Date and Time       Expected Discharge Date Expected Discharge Time    Jun 26, 2024            Demographic Summary       Row Name 06/24/24 1707       General Information    Admission Type observation                   Functional Status       Row Name 06/24/24 1704       Functional Status    Usual Activity Tolerance excellent    Current Activity Tolerance good       Assessment of Health Literacy    Health Literacy Good       Functional Status, IADL    Medications independent    Meal Preparation independent    Housekeeping independent    Laundry independent    Shopping independent       Mental Status    General Appearance WDL WDL       Mental Status Summary    Recent Changes in Mental Status/Cognitive Functioning no changes                   Psychosocial    No documentation.                  Abuse/Neglect    No documentation.                  Legal       Row Name 06/24/24 170       Financial/Legal    Who Manages Finances if Patient Unable Lanre                   Substance Abuse    No documentation.                  Patient Forms    No documentation.                     Ludy Mast RN

## 2024-06-24 NOTE — ANESTHESIA PROCEDURE NOTES
Airway  Urgency: elective    Date/Time: 6/24/2024 10:19 AM    General Information and Staff    Patient location during procedure: OR  CRNA/CAA: Malena Dash CRNA    Indications and Patient Condition  Indications for airway management: airway protection    Preoxygenated: yes  Mask difficulty assessment: 1 - vent by mask    Final Airway Details  Final airway type: endotracheal airway      Successful airway: ETT  Cuffed: yes   Successful intubation technique: direct laryngoscopy  Facilitating devices/methods: cricoid pressure and Bougie  Endotracheal tube insertion site: oral  Blade: Parsons  Blade size: 2  ETT size (mm): 7.0  Cormack-Lehane Classification: grade IIb - view of arytenoids or posterior of glottis only  Placement verified by: capnometry   Measured from: lips  ETT/EBT  to lips (cm): 22  Number of attempts at approach: 1  Assessment: lips, teeth, and gum same as pre-op and atraumatic intubation

## 2024-06-24 NOTE — ANESTHESIA POSTPROCEDURE EVALUATION
Patient: FREDY Rivera    Procedure Summary       Date: 06/24/24 Room / Location: [facility] OR [address] [place] [address] TRACE OR [facility]    Anesthesia Start: 1008 Anesthesia Stop: 1152    Procedure: TOTAL HIP ARTHROPLASTY ANTERIOR WITH HANA TABLE (Right: Hip) Diagnosis:     Surgeons: Sheng Zamora II, MD Provider: Jesse Chopra MD    Anesthesia Type: general ASA Status: 2            Anesthesia Type: general    Vitals  Vitals Value Taken Time   /64 06/24/24 1245   Temp 36.8 °C (98.2 °F) 06/24/24 1150   Pulse 66 06/24/24 1256   Resp 16 06/24/24 1215   SpO2 91 % 06/24/24 1256   Vitals shown include unfiled device data.        Post Anesthesia Care and Evaluation    Patient location during evaluation: bedside  Patient participation: complete - patient participated  Level of consciousness: awake and alert  Pain management: adequate    Airway patency: patent  Anesthetic complications: No anesthetic complications  PONV Status: controlled  Cardiovascular status: blood pressure returned to baseline and acceptable  Respiratory status: acceptable  Hydration status: acceptable

## 2024-06-24 NOTE — ANESTHESIA PREPROCEDURE EVALUATION
Anesthesia Evaluation     NPO Solid Status: > 8 hours             Airway   Mallampati: III  Dental      Pulmonary    (-) sleep apnea, not a smoker    ROS comment: Negative patient screen for AMADOU    Cardiovascular     (+) hyperlipidemia  (-) hypertension      Neuro/Psych  GI/Hepatic/Renal/Endo    (+) obesity    Musculoskeletal     Abdominal    Substance History      OB/GYN          Other                    Anesthesia Plan    ASA 2     general       Anesthetic plan, risks, benefits, and alternatives have been provided, discussed and informed consent has been obtained with: patient.    CODE STATUS:

## 2024-06-24 NOTE — H&P
Orthopaedic Surgery  History & Physical For Elective Total Hip  Dr. KEMAR aZmora II  [phone]    HPI:  Patient is a 79 y.o. Not  or  female who presents with End-stage arthritis of the right hip.  They failed conservative treatment of their hip pain and a thorough discussion of the risks and benefits of surgery was had.  The patient wishes to continue with elective total hip replacement, they were scheduled and are here for surgery. They did get medical clearance as well as a thorough preoperative workup.     MEDICAL HISTORY  Past Medical History:   Diagnosis Date    Elevated cholesterol     Restless leg syndrome     Skin cancer      Past Surgical History:   Procedure Laterality Date    BREAST SURGERY      BREAST IMPLANTS    CHOLECYSTECTOMY      COLONOSCOPY N/A 04/28/2017    Procedure: COLONOSCOPY;  Surgeon: Gayathri Yates MD;  Location: [facility] ENDOSCOPY;  Service:     COLONOSCOPY N/A 04/25/2022    Procedure: COLONOSCOPY;  Surgeon: Gayathri Yates MD;  Location: [facility] MAIN OR;  Service: Gastroenterology;  Laterality: N/A;  polyps, diverticulosis    SKIN CANCER EXCISION      TONSILLECTOMY      TUBAL ABDOMINAL LIGATION       Prior to Admission medications    Medication Sig Start Date End Date Taking? Authorizing Provider   gabapentin (NEURONTIN) 300 MG capsule Take 1 capsule by mouth every night at bedtime. 3/7/24   Rickey Schwartz MD   HYDROcodone-acetaminophen (NORCO) 5-325 MG per tablet Take 1 tablet by mouth Every 6 (Six) Hours As Needed. for pain 6/12/24   Rickey Schwartz MD   ibuprofen (ADVIL,MOTRIN) 600 MG tablet HOLD FOR SURGERY PER MD INSTRUCTIONS    Rickey Schwartz MD   loratadine (Claritin) 10 MG tablet Take 1 tablet by mouth Daily.    Rickey Schwartz MD   rOPINIRole (REQUIP) 1 MG tablet TAKE ONE TABLET BY MOUTH  PM, THEN TAKE TWO TABLETS BY MOUTH AT BEDTIME    Rickey Schwartz MD   rosuvastatin (CRESTOR) 40 MG tablet Take 1 tablet by mouth  Daily.    Provider, MD Rickey     No Known Allergies    There is no immunization history on file for this patient.  Social History     Tobacco Use    Smoking status: Never    Smokeless tobacco: Never   Substance Use Topics    Alcohol use: No      Social History     Substance and Sexual Activity   Drug Use No       REVIEW OF SYSTEMS:  Head: negative for headache  Respiratory: negative for shortness of breath.   Cardiovascular: negative for chest pain.   Gastrointestinal: negative abdominal pain.   Neurological: negative for LOC  Psychiatric/Behavioral: negative for memory loss.   All other systems reviewed and are negative    VITALS: /80 (BP Location: Left arm, Patient Position: Lying)   Pulse 74   Temp 98.2 °F (36.8 °C) (Oral)   Resp 16   SpO2 97%  There is no height or weight on file to calculate BMI.    PHYSICAL EXAM:   CONSTITUTIONAL: A&Ox3, No acute distress  LUNGS: Equal chest rise, no shortness of air  CARDIOVASCULAR: palpable peripheral pulses  SKIN: no skin lesions in the area examined  LYMPH: no lymphadenopathy in the area examined  EXTREMITY: Hip  Pulses:  Brisk Capillary Refill  Sensation: Intact to Saphenous, Sural, Deep Peroneal, Superficial Peroneal, and Tibial Nerves and grossly throughout extremity  Motor: 5/5 EHL/FHL/TA/GS motor complexes    RADIOLOGY REVIEW:   XR Chest PA & Lateral    Result Date: 6/24/2024  1. Borderline cardiomegaly. 2. No acute process identified.    AP PELVIS AND RIGHT HIP TWO VIEWS  There is mild flattening of the articular surface of the right femoral head which demonstrates minimally heterogeneous density. Minimal hypertrophic change of the lateral aspect of the right femoral head is seen. The right hip joint space appears well-maintained. The left hip appears normal.  Lower lumbar degenerative changes are seen.  IMPRESSION: 1. Mild flattening of the articular surface of the right femoral head with mildly heterogeneous density. FINDINGS could be related to  avascular necrosis of the right femoral head. 2. No fractures or other acute bony abnormalities are seen.  This report was finalized on 6/24/2024 7:20 AM by Dr. Alonso To M.D on Workstation: [id number]      XR Hip With or Without Pelvis 2 - 3 View Right    Result Date: 6/24/2024  1. Borderline cardiomegaly. 2. No acute process identified.    AP PELVIS AND RIGHT HIP TWO VIEWS  There is mild flattening of the articular surface of the right femoral head which demonstrates minimally heterogeneous density. Minimal hypertrophic change of the lateral aspect of the right femoral head is seen. The right hip joint space appears well-maintained. The left hip appears normal.  Lower lumbar degenerative changes are seen.  IMPRESSION: 1. Mild flattening of the articular surface of the right femoral head with mildly heterogeneous density. FINDINGS could be related to avascular necrosis of the right femoral head. 2. No fractures or other acute bony abnormalities are seen.  This report was finalized on 6/24/2024 7:20 AM by Dr. Alonso To M.D on Workstation: [id number]       LABS:   Results for the past 24 hours: No results found for this or any previous visit (from the past 24 hour(s)).    IMPRESSION:  Patient is a 79 y.o. Not  or  female with end-stage osteoarthritis of the right hip    PLAN:   Surgery: Elective total hip arthroplasty  Consent: The risks and benefits of operative versus nonoperative treatment were discussed. The patient elected to undergo operative treatment of their hip. The risks discussed included but were not limited to blood clots, MI, stroke, other medical complications, infection, dislocation, fracture, damage to neurovascular structures, continued pain, hardware prominence, loss of range of motion, stiffness, need for further procedures, and and risk of anesthesia. No guarantees were made   Disposition: Elective right Total Hip Arthroplasty today.    Sheng Zamora II  MD  Orthopaedic Surgery  [facility]

## 2024-06-24 NOTE — OP NOTE
Anterior Cemented Total Hip Operative Note  Dr. KEMAR Zamora II  [phone]    PATIENT NAME: FREDY Rivera  MRN: [id number]  : 1945 AGE: 79 y.o. GENDER: female  DATE OF OPERATION: 2024  PREOPERATIVE DIAGNOSIS: End stage Osteoarthritis  POSTOPERATIVE DIAGNOSIS: Same  OPERATION PERFORMED: Right Cemented Anterior Total Hip Arthroplasty  SURGEON: Sheng Zamora MD  Circulator: John Kurtz RN  Radiology Technologist: Ludy James  Scrub Person: Kiran Jenkins  Nursing Assistant: Matilda Lauren  Assistant: Iraida Beth PA  ANESTHESIA: General  ASSISTANT: AL Garcia. This case would not have been possible without another set of skilled surgical hands for retraction, use of instrumentation, and general assistance.  This assistance was vital to the success of the case.   ESTIMATED BLOOD LOSS: 250cc  SPONGE AND NEEDLE COUNT: Correct  INDICATIONS:  Arthritis: This patient was noted to have severe arthritis affecting the operative hip. They failed conservative management and elected to undergo total hip replacement. A discussion of operative versus nonoperative treatment was had. They elected to undergo anterior total hip arthroplasty. The risks of surgery were discussed and included the risk of anesthesia, infection, damage to neurovascular structures, implant loosening/failure, fracture, hardware prominence, dislocation, the need for further procedures, medical complications, and others. No guarantees were made. The patient wished to proceed with surgery and a surgical consent was signed.  COMPONENTS:   Acetabular Cup: Smith & Nephew R3 acetabular cup: 50 Outer Diameter  Cup Screws: Smith & Nephew 6.5 mm screws: No Screws Were Used  Smith & Nephew Neutral Acetabular Liner: Neutral  Smith & Nephew Polar Cemented Stem: Size 2  Smith & Nephew Oxinium Head: 32mm +0  2 packs cement    PERTINENT FINDINGS: Arthritis: Endstage degenerative arthritis of the femoral head and  acetabulum.    DETAILS OF PROCEDURE:   The patient was met in the preoperative area. The site was marked. The consent and H&P were reviewed. The patient was then wheeled back to the operative suite underwent anesthesia. The Bosler table boots were secured to the patients’ feet. The patient was moved onto the [place] table and secured in the supine position. The perineal post was inserted and the boots were secured into the leg holders. Surgical alcohol was used to thoroughly clean the operative area.     The hip and leg was then prepped in the normal sterile fashion, multiple layers of sterile drapes, and surgical space suits for the entire operative team. New outer gloves were used by all sterile surgical team members after final draping. The surgical incision was marked. A surgical timeout was performed.    A Modified Francisco-Camara anterior approach was used. Dissection was carried down to the fascia. The fascia was incised and the tensor fascia zhen muscle was retracted laterally and the sartorius medially. The lateral femoral circumflex vessels were identified and cauterized using bovie. The rectus femoris was retracted medially. A capsulotomy was then performed. The capsule was tagged with Ethibond for later repair. Retractors were placed on either side of the femoral neck and dissection was further carried down so that the lesser trochanter could be palpated and the superior rim of the acetabulum could be visualized.    The femoral neck cut was then made from  just proximal to the lesser trochanter medially headed towards the saddle laterally. Care was taken not to extend the cut into the lesser or greater trochanters. The head and neck segment were removed with a corkscrew. The acetabulum was then exposed. The labrum was removed using a kocher and scalpel and excess osteophytes were removed using an osteotome.    The acetabulum was progressively reamed, beginning with medialization and then finalizing the  position of the reamer to approximate the final cup position. Fluoroscopy was used to ensure proper placement of the reamer, including adequate medialization as well as appropriate abduction and anteversion. The real cup was then opened and inserted using fluoroscopy, ensuring good position in terms of abduction and anteversion.     No Screws: Initial press-fit fixation of the cup was very robust and no screws were required for supplemental fixation.    After thorough irrigation and ensuring that no soft tissue was entrapped within the cup, the real liner was snapped into place.    The hook was placed just distal to the greater trochanter. The femur was then externally rotated, extended and adducted under the well leg. Soft tissue releases were performed to gain exposure to the proximal femur. Capsule was released from the saddle and the lateral femur. Care was taken to preserve the short external rotator tendons. The capsule was also released along the medial femur. The hydraulic femoral lift was then used to better expose the femur. Further soft tissue releases were then performed, again ensuring preservation of the short external rotators.    The femur was machined with a cookie cutter osteotome and then a rasp was used to further lateralize the starting point. The sclerotic bone on the lateral shoulder of the femur was removed with a rongeour and curette as needed to protect the greater trochanter. Progressive broaches were inserted until adequate fill had been achieved. Using fluoroscopy, the femoral stem was visualized after trial reduction of the hip. The length and offset were compared to the non-operative hip. Trials of stem size and neck length were trialed until equal leg length and offset were obtained. Additionally hip stability was tested with internal and external rotation of the leg. The leg was stable with at least 90° of external rotation and there was no impingement at 60° of internal rotation.  "Cement was mixed while the hip was dislocated and the trial implants removed. The femoral canal was prepared for cement and a cement restrictor was used. After implantation of the final stem and ball, the leg was once again brought into normal anatomic position and relocated. Final x-rays were taken with final implants noting good position of the stem and cup, and no visualized fracture.    An analgesic cocktail was then injected about the hip as well as the surgical dissection area. The capsule was closed.  The deep fascia was closed with a running stitch.  The skin was closed in layers and a sterile dressing was applied.    The patient was moved from the [place] table to the [facility] where the boots were removed. The patient was taken to the recovery room in stable condition. There were no complications and the patient tolerated the procedure well.    R \"Freddy\" Sera BARNARD MD  Orthopaedic Surgery  [facility]  [phone]              "

## 2024-06-25 ENCOUNTER — HOME HEALTH ADMISSION (OUTPATIENT)
Dept: HOME HEALTH SERVICES | Facility: HOME HEALTHCARE | Age: 79
End: 2024-06-25
Payer: MEDICARE

## 2024-06-25 LAB
HCT VFR BLD AUTO: 31.9 % (ref 34–46.6)
HGB BLD-MCNC: 10.6 G/DL (ref 12–15.9)

## 2024-06-25 PROCEDURE — 63710000001 ASPIRIN 81 MG TABLET DELAYED-RELEASE: Performed by: ORTHOPAEDIC SURGERY

## 2024-06-25 PROCEDURE — A9270 NON-COVERED ITEM OR SERVICE: HCPCS | Performed by: ORTHOPAEDIC SURGERY

## 2024-06-25 PROCEDURE — 63710000001 SCOPOLAMINE 1 MG/3DAYS PATCH 72 HOUR: Performed by: ORTHOPAEDIC SURGERY

## 2024-06-25 PROCEDURE — G0378 HOSPITAL OBSERVATION PER HR: HCPCS

## 2024-06-25 PROCEDURE — 63710000001 DOCUSATE SODIUM 100 MG CAPSULE: Performed by: ORTHOPAEDIC SURGERY

## 2024-06-25 PROCEDURE — 63710000001 FAMOTIDINE 20 MG TABLET: Performed by: ORTHOPAEDIC SURGERY

## 2024-06-25 PROCEDURE — 63710000001 ROPINIROLE 2 MG TABLET: Performed by: ORTHOPAEDIC SURGERY

## 2024-06-25 PROCEDURE — 25010000002 CEFAZOLIN PER 500 MG: Performed by: ORTHOPAEDIC SURGERY

## 2024-06-25 PROCEDURE — 85018 HEMOGLOBIN: CPT | Performed by: ORTHOPAEDIC SURGERY

## 2024-06-25 PROCEDURE — 63710000001 MELOXICAM 15 MG TABLET: Performed by: ORTHOPAEDIC SURGERY

## 2024-06-25 PROCEDURE — 97161 PT EVAL LOW COMPLEX 20 MIN: CPT

## 2024-06-25 PROCEDURE — 25010000002 ONDANSETRON PER 1 MG: Performed by: ORTHOPAEDIC SURGERY

## 2024-06-25 PROCEDURE — 97110 THERAPEUTIC EXERCISES: CPT

## 2024-06-25 PROCEDURE — 63710000001 OXYCODONE-ACETAMINOPHEN 5-325 MG TABLET: Performed by: ORTHOPAEDIC SURGERY

## 2024-06-25 PROCEDURE — 85014 HEMATOCRIT: CPT | Performed by: ORTHOPAEDIC SURGERY

## 2024-06-25 RX ORDER — SCOLOPAMINE TRANSDERMAL SYSTEM 1 MG/1
1 PATCH, EXTENDED RELEASE TRANSDERMAL
Status: DISCONTINUED | OUTPATIENT
Start: 2024-06-25 | End: 2024-06-27 | Stop reason: HOSPADM

## 2024-06-25 RX ORDER — PROMETHAZINE HYDROCHLORIDE 25 MG/1
25 TABLET ORAL EVERY 6 HOURS PRN
Status: DISCONTINUED | OUTPATIENT
Start: 2024-06-25 | End: 2024-06-27 | Stop reason: HOSPADM

## 2024-06-25 RX ADMIN — OXYCODONE HYDROCHLORIDE AND ACETAMINOPHEN 1 TABLET: 5; 325 TABLET ORAL at 10:56

## 2024-06-25 RX ADMIN — ROPINIROLE 2 MG: 2 TABLET, FILM COATED ORAL at 20:24

## 2024-06-25 RX ADMIN — ASPIRIN 81 MG: 81 TABLET, COATED ORAL at 20:24

## 2024-06-25 RX ADMIN — SCOPALAMINE 1 PATCH: 1 PATCH, EXTENDED RELEASE TRANSDERMAL at 11:11

## 2024-06-25 RX ADMIN — OXYCODONE HYDROCHLORIDE AND ACETAMINOPHEN 1 TABLET: 5; 325 TABLET ORAL at 05:16

## 2024-06-25 RX ADMIN — ONDANSETRON 4 MG: 2 INJECTION INTRAMUSCULAR; INTRAVENOUS at 08:02

## 2024-06-25 RX ADMIN — DOCUSATE SODIUM 100 MG: 100 CAPSULE, LIQUID FILLED ORAL at 15:26

## 2024-06-25 RX ADMIN — ASPIRIN 81 MG: 81 TABLET, COATED ORAL at 10:57

## 2024-06-25 RX ADMIN — SODIUM CHLORIDE 2000 MG: 900 INJECTION INTRAVENOUS at 02:33

## 2024-06-25 RX ADMIN — OXYCODONE HYDROCHLORIDE AND ACETAMINOPHEN 1 TABLET: 5; 325 TABLET ORAL at 15:25

## 2024-06-25 RX ADMIN — OXYCODONE HYDROCHLORIDE AND ACETAMINOPHEN 1 TABLET: 5; 325 TABLET ORAL at 20:24

## 2024-06-25 RX ADMIN — MELOXICAM 15 MG: 15 TABLET ORAL at 10:57

## 2024-06-25 RX ADMIN — FAMOTIDINE 40 MG: 20 TABLET, FILM COATED ORAL at 10:57

## 2024-06-25 NOTE — CASE MANAGEMENT/SOCIAL WORK
Continued Stay Note  [facility]     Patient Name: FREDY Rivera  MRN: [id number]  Today's Date: 6/25/2024    Admit Date: 6/24/2024    Plan: Home with family support & [facility].   Discharge Plan       Row Name 06/25/24 1203       Plan    Plan Home with family support & [facility].    Patient/Family in Agreement with Plan yes    Plan Comments Spoke with [facility] who has accepted. Careplan received from [facility] which plans for [facility]. CCP discussed with the patient who said she would like [facility]. Referral sent in [facility]. Updated [facility]. CCP to follow.                   Discharge Codes    No documentation.                 Expected Discharge Date and Time       Expected Discharge Date Expected Discharge Time    Jun 26, 2024               Jamee GONGORA RN

## 2024-06-25 NOTE — THERAPY EVALUATION
Patient Name: FREDY Rivera  : 1945    MRN: [id number]                              Today's Date: 2024       Admit Date: 2024    Visit Dx: No diagnosis found.  Patient Active Problem List   Diagnosis    Colon cancer screening    Hip joint replacement status     Past Medical History:   Diagnosis Date    Elevated cholesterol     Restless leg syndrome     Skin cancer      Past Surgical History:   Procedure Laterality Date    BREAST SURGERY      BREAST IMPLANTS    CHOLECYSTECTOMY      COLONOSCOPY N/A 2017    Procedure: COLONOSCOPY;  Surgeon: Gayathri Yates MD;  Location: [facility] ENDOSCOPY;  Service:     COLONOSCOPY N/A 2022    Procedure: COLONOSCOPY;  Surgeon: Gayathri Yates MD;  Location: [facility] OR;  Service: Gastroenterology;  Laterality: N/A;  polyps, diverticulosis    SKIN CANCER EXCISION      TONSILLECTOMY      TUBAL ABDOMINAL LIGATION        General Information       Row Name 24 1307          Physical Therapy Time and Intention    Document Type evaluation  -MS     Mode of Treatment physical therapy  -MS       Row Name 24 1307          General Information    Patient Profile Reviewed yes  -MS     Prior Level of Function independent:;all household mobility  use of  RW, friend can help as needed  -MS     Existing Precautions/Restrictions fall  -MS     Barriers to Rehab none identified  -MS       Row Name 24 1307          Living Environment    People in Home friend(s)  -MS       Row Name 24 1307          Home Main Entrance    Number of Stairs, Main Entrance three  -MS     Stair Railings, Main Entrance railings safe and in good condition  -MS       Row Name 24 1307          Stairs Within Home, Primary    Stairs, Within Home, Primary bedroom is on main level.  -MS       Row Name 24 1307          Cognition    Orientation Status (Cognition) oriented x 4  -MS       Row Name 24 1307          Safety Issues, Functional Mobility    Safety  Issues Affecting Function (Mobility) awareness of need for assistance;insight into deficits/self-awareness;positioning of assistive device;sequencing abilities  -MS     Impairments Affecting Function (Mobility) balance;endurance/activity tolerance;strength;pain;postural/trunk control;range of motion (ROM)  -MS     Comment, Safety Issues/Impairments (Mobility) Gait belt and non skid socks donned.  -MS               User Key  (r) = Recorded By, (t) = Taken By, (c) = Cosigned By      Initials Name Provider Type    MS ScottSofy, PT Physical Therapist                   Mobility       Row Name 06/25/24 1308          Bed Mobility    Bed Mobility supine-sit;sit-supine  -MS     Supine-Sit Stevens (Bed Mobility) contact guard;verbal cues;nonverbal cues (demo/gesture)  -MS     Assistive Device (Bed Mobility) bed rails;head of bed elevated  -MS     Comment, (Bed Mobility) SV-SBA for sitting balance. Encouragement needed.  -MS       Row Name 06/25/24 1308          Transfers    Comment, (Transfers) Sequencing and hand placement cues.  -MS       Row Name 06/25/24 1308          Sit-Stand Transfer    Sit-Stand Stevens (Transfers) contact guard;verbal cues;nonverbal cues (demo/gesture)  -MS     Assistive Device (Sit-Stand Transfers) walker, front-wheeled  -MS       Row Name 06/25/24 1308          Gait/Stairs (Locomotion)    Stevens Level (Gait) contact guard;verbal cues;nonverbal cues (demo/gesture)  -MS     Assistive Device (Gait) walker, front-wheeled  -MS     Patient was able to Ambulate yes  -MS     Distance in Feet (Gait) 25  -MS     Deviations/Abnormal Patterns (Gait) wilfrid decreased;gait speed decreased;antalgic  -MS     Bilateral Gait Deviations forward flexed posture  -MS     Comment, (Gait/Stairs) Nausea and pain limiting. Agreeable to sitting [facility]IC post PT.  -MS       Row Name 06/25/24 1308          Mobility    Extremity Weight-bearing Status right lower extremity  -MS     Right Lower Extremity  (Weight-bearing Status) weight-bearing as tolerated (WBAT)  -MS               User Key  (r) = Recorded By, (t) = Taken By, (c) = Cosigned By      Initials Name Provider Type    Sofy Stein, PT Physical Therapist                   Obj/Interventions       Row Name 06/25/24 1309          Range of Motion Comprehensive    Comment, General Range of Motion R LE limited d/t soreness  -MS       Row Name 06/25/24 1309          Strength Comprehensive (MMT)    Comment, General Manual Muscle Testing (MMT) Assessment R LE post op weakness  -MS       Row Name 06/25/24 1309          Motor Skills    Therapeutic Exercise --  R ANTONI protocol x 10 reps, incr time required.  -MS       Row Name 06/25/24 1309          Balance    Balance Assessment sitting static balance;standing static balance  -MS     Static Sitting Balance standby assist  -MS     Position, Sitting Balance sitting edge of bed  -MS     Static Standing Balance contact guard  -MS     Position/Device Used, Standing Balance supported;walker, rolling  -MS       Row Name 06/25/24 1309          Sensory Assessment (Somatosensory)    Sensory Assessment (Somatosensory) sensation intact  -MS               User Key  (r) = Recorded By, (t) = Taken By, (c) = Cosigned By      Initials Name Provider Type    Sofy Stein, PT Physical Therapist                   Goals/Plan       Row Name 06/25/24 1312          Bed Mobility Goal 1 (PT)    Activity/Assistive Device (Bed Mobility Goal 1, PT) bed mobility activities, all  -MS     [place] Level/Cues Needed (Bed Mobility Goal 1, PT) supervision required  -MS     Time Frame (Bed Mobility Goal 1, PT) 1 week  -MS       Row Name 06/25/24 1312          Transfer Goal 1 (PT)    Activity/Assistive Device (Transfer Goal 1, PT) transfers, all;walker, rolling  -MS     [place] Level/Cues Needed (Transfer Goal 1, PT) supervision required  -MS     Time Frame (Transfer Goal 1, PT) 1 week  -MS       Row Name 06/25/24 1312           Gait Training Goal 1 (PT)    Activity/Assistive Device (Gait Training Goal 1, PT) gait (walking locomotion);walker, rolling  -MS     [place] Level (Gait Training Goal 1, PT) supervision required  -MS     Distance (Gait Training Goal 1, PT) 150  -MS     Time Frame (Gait Training Goal 1, PT) 1 week  -MS       Row Name 06/25/24 1312          Therapy Assessment/Plan (PT)    Planned Therapy Interventions (PT) balance training;bed mobility training;gait training;home exercise program;postural re-education;patient/family education;ROM (range of motion);stair training;strengthening;transfer training  -MS               User Key  (r) = Recorded By, (t) = Taken By, (c) = Cosigned By      Initials Name Provider Type    Sofy Stein, PT Physical Therapist                   Clinical Impression       Row Name 06/25/24 1311          Pain    Pretreatment Pain Rating 5/10  -MS     Posttreatment Pain Rating 5/10  -MS     Pain Location - Side/Orientation Right  -MS     Pain Location lower  -MS     Pain Location - extremity  -MS     Pain Intervention(s) Repositioned;Ambulation/increased activity;Rest  -MS       Row Name 06/25/24 1311          Therapy Assessment/Plan (PT)    Rehab Potential (PT) good, to achieve stated therapy goals  -MS     Criteria for Skilled Interventions Met (PT) yes;meets criteria  -MS     Therapy Frequency (PT) daily  -MS       Row Name 06/25/24 1311          Vital Signs    O2 Delivery Pre Treatment room air  -MS       Row Name 06/25/24 1311          Positioning and Restraints    Pre-Treatment Position in bed  -MS     Post Treatment Position chair  -MS     In Chair notified nsg;reclined;call light within reach;encouraged to call for assist;exit alarm on;with family/caregiver  -MS               User Key  (r) = Recorded By, (t) = Taken By, (c) = Cosigned By      Initials Name Provider Type    Sofy Stein, PT Physical Therapist                   Outcome Measures       Row Name 06/25/24 1312  06/25/24 0801       How much help from another person do you currently need...    Turning from your back to your side while in flat bed without using bedrails? 3  -MS 3  -MN (r) CB (t) MN (c)    Moving from lying on back to sitting on the side of a flat bed without bedrails? 3  -MS 3  -MN (r) CB (t) MN (c)    Moving to and from a bed to a chair (including a wheelchair)? 3  -MS 3  -MN (r) CB (t) MN (c)    Standing up from a chair using your arms (e.g., wheelchair, bedside chair)? 3  -MS 3  -MN (r) CB (t) MN (c)    Climbing 3-5 steps with a railing? 2  -MS 3  -MN (r) CB (t) MN (c)    To walk in hospital room? 3  -MS 3  -MN (r) CB (t) MN (c)    AM-PAC 6 Clicks Score (PT) 17  -MS 18  -MN (r) CB (t)    Highest Level of Mobility Goal 5 --> Static standing  -MS 6 --> Walk 10 steps or more  -MN (r) CB (t)              User Key  (r) = Recorded By, (t) = Taken By, (c) = Cosigned By      Initials Name Provider Type    Falguni Rodrigues, RN Registered Nurse    Sofy Stein, PT Physical Therapist    Jhonatan Ornelas RN Extern Registered Nurse                                 Physical Therapy Education       Title: PT OT SLP Therapies (Done)       Topic: Physical Therapy (Done)       Point: Mobility training (Done)       Learning Progress Summary             Patient Acceptance, E,TB, VU by MS at 6/25/2024 1312                         Point: Home exercise program (Done)       Learning Progress Summary             Patient Acceptance, E,TB, VU by MS at 6/25/2024 1312                         Point: Body mechanics (Done)       Learning Progress Summary             Patient Acceptance, E,TB, VU by MS at 6/25/2024 1312                         Point: Precautions (Done)       Learning Progress Summary             Patient Acceptance, E,TB, VU by MS at 6/25/2024 1312                                         User Key       Initials Effective Dates Name Provider Type Discipline    MS 06/16/21 -  Sofy Chavez, PT Physical  Therapist PT                  PT Recommendation and Plan  Planned Therapy Interventions (PT): balance training, bed mobility training, gait training, home exercise program, postural re-education, patient/family education, ROM (range of motion), stair training, strengthening, transfer training        Time Calculation:         PT Charges       Row Name 06/25/24 1307             Time Calculation    Start Time 0812  -MS      Stop Time 0830  -MS      Time Calculation (min) 18 min  -MS      PT Received On 06/25/24  -MS      PT - Next Appointment 06/26/24  -MS      PT Goal Re-Cert Due Date 06/28/24  -MS                User Key  (r) = Recorded By, (t) = Taken By, (c) = Cosigned By      Initials Name Provider Type    Sofy Stein, PT Physical Therapist                  Therapy Charges for Today       Code Description Service Date Service Provider Modifiers Qty    94415895697 HC PT EVAL LOW COMPLEXITY 2 6/25/2024 Sofy Chavez, PT GP 1    38488874485 HC PT THER PROC EA 15 MIN 6/25/2024 Sofy Chavez, PT GP 1            PT G-Codes  AM-PAC 6 Clicks Score (PT): 17  PT Discharge Summary  Anticipated Discharge Disposition (PT): home with assist, home with home health    Sofy Chavez PT  6/25/2024

## 2024-06-25 NOTE — PLAN OF CARE
Goal Outcome Evaluation:     Patient is a 79 y.o. female POD1 R ANTONI-anterior with expected post op weakness and impaired functional mobility. Patient continues to be very nauseous and actually vomited during therapy evaluation this date. Patient is ambulatory with a RW at baseline and lives at home with a friend. Patient request to order RW as hers does not have wheels- order placed in Epic. Today, patient performed bed mobility with CGA, required CGA for transfers, and ambulated 25' CGA with a RW. Encouragement required for all mobility. Patient will benefit from skilled PT services acutely to address functional deficits as well as improve level of independence prior to discharge. Anticipate and hopeful for patient to return home with  PT upon DC- education provided regarding importance of ambulating during hospitalization and being [facility] for all meals to ensure safe to DC home likely tomorrow.      Anticipated Discharge Disposition (PT): home with assist, home with home health

## 2024-06-25 NOTE — DISCHARGE INSTRUCTIONS
Total Hip  Discharge Instructions  Dr. KEMAR Childress” Sera II  [phone]    INCISION CARE  Wash your hands prior to dressing changes  LAURA Wound VAC: Postoperatively you had a LAURA Wound Vac placed on the incision. This was placed under sterile conditions in the operating room. It remains in place for 7 days postoperatively. After 7 days, the entire dressing must be removed, including all of the sticky adhesive. The dressing and battery pack provide gentle suction to the incision and provide several benefits over a traditional dressing:  It maintains the sterile environment of the OR and reduces the risk of infection  The suction removes unwanted buildup of blood/hematoma under the skin to reduce swelling  The suction also promotes fresh blood supply to the skin and soft tissue to speed up healing  The postoperative scar is reduced in size  Showering is permitted immediately after surgery, but the battery pack must be protected or removed during the shower.   After 7 days the LAURA Wound Vac is removed. If there is no drainage, no dressing is required. If there is some scant drainage a dry bandage can be applied and changed daily until seen in the office or until the drainage stops.   No creams or ointments to the incisions until 4 weeks post op.  Do not touch or pick at the incision  Check incision every day and notify surgeon immediately if any of the following signs or symptoms are seen:  Increase in redness  Increase in swelling around the incision and of the entire extremity  Increase in pain  NEW drainage or oozing from the incision  Pulling apart of the edges of the incision  Increase in overall body temperature (greater than 100.4 degrees)  Zip-Line: your incision was closed with a state of the art device.   Is a non-invasive and easy to use wound closure device that replaces sutures, staples and glue for surgical incisions  It minimizes scarring and eliminating “railroad” marks that come with staples or  sutures  It makes removal as atraumatic as peeling off a bandage  Can be removed at home or by a physical therapist or nurse at 14 days postoperatively    ACTIVITIES  Exercises:  Physical therapy will begin immediately while in the hospital. Patients going to a nursing home will get therapy as part of their care at the SNU/SNF facility. Patients going home may also have a therapist come to the house to help them mobilize until they can safely get to an outpatient therapy facility.  Elevate the affected leg most of the day during the first week post operatively. Caution must be taken to avoid pillow placement directly under the heel of the leg, as this can cause pressure ulcers even with a soft pillow. All pillows and blankets should be placed underneath of the thigh and calf so that the heel is free-floating.  Use cold packs for 20-30 minutes approximately 5 times per day.  You should perform the daily stretching and strengthening exercises as taught by the therapist as often as possible. This can be done many times a day.  Full weight bearing is allowed after surgery. It will be sore/painful to put weight on the leg, but this will help the bone to heal and prevent complications such as pneumonia, bed sores and blood clots. Mobilization is vital to the recovery process.  Activities of Daily Living:  No tub baths, hot tubs, or swimming pools for 4 weeks.  May shower and let water run over the incision immediately after surgery. The battery pack of the LAURA Wound Vac must be protected or removed while in the shower. After the LAURA is removed 7 days after surgery showering is permitted as long as there is no drainage from the wound.     Restrictions  Weight: It is ok to allow full weight bearing after surgery. Weight on the leg actually quickens the recovery process. While it will be sore/painful to put weight on the leg, it is safe to do so. Hip replacement after hip fracture has a much slower recover process. It can  take months to heal fully from a hip fracture and patients even make some slow benefits up to a year afterwards.   Driving: Many patients have questions about when it is safe to return to driving. The answer is that this is extremely variable. It depends on the extent of the surgery, as well as how quickly you heal. Certainly left leg surgeries make returning to driving easier while right leg surgeries require more extensive rehabilitation before driving can be safe. Until you can press down on the brake hard, and are off of all narcotics, driving is not permitted. Your surgeon cannot “clear” you to return to driving, only you can make the decision when you feel it is safe.    Medications  Anticoagulants: After upper extremity surgery most patients do not require an anticoagulant unless you have another injury that will be keeping you from mobilizing. Lower extremity surgery typically does require use of an anticoagulation medicine.   IF YOU HAD LOWER EXTREMITY SURGERY AND ARE NOT DISCHARGED HOME WITH ANY ANTICOAGULANT MEDICINE YOU SHOULD TAKE ASPIRIN 325mg DAILY FOR 30 DAYS POSTOPERATIVELY.  If you are discharged home with an anticoagulant such as Aspirin, Xarelto, Eliquis, Coumadin, or Lovenox, follow these simple instructions:   Notify surgeon immediately if any nicholas bleeding is noted in the urine, stool, emesis, or from the nose or the incision. Blood in the stool will often appear as black rather than red. Blood in urine may appear as pink. Blood in emesis may appear as brown/black like coffee grounds.  You will need to apply pressure for longer periods of time to any cuts or abrasions to stop bleeding  Avoid alcohol while taking anticoagulants  Most anticoagulants are to be taken for 30 days postoperatively. After this time, you may stop using them unless instructed otherwise.   If you were already taking an anticoagulant (commonly Aspirin, Coumadin, or Plavix) you will likely be resuming your normal dose  postoperatively and will be continuing that medication at the discretion of the prescribing physician.  Stool Softeners: You will be at greater risk of constipation after surgery due to being less mobile and the pain medications.  Take stool softeners as needed. Over the counter Colace 100 mg 1-2 capsules twice daily can be taken.  If stools become too loose or too frequent, please decreases the dosage or stop the stool softener.  If constipation occurs despite use of stool softeners, you are to continue the stool softeners and add a laxative (Milk of Magnesia 1 ounce daily as needed)  Drink plenty of fluids, and eat fruits and vegetables during your recovery time. Getting up and mobilizing will help the bowels to recover their regular function, as will weaning off of all narcotics when the pain becomes tolerable.  Pain Medications: Utilized after surgery are narcotics. This is some general information about these medications.  CLASSIFICATION: Pain medications are called Opioids and are narcotics  LEGALITIES: It is illegal to share narcotics with others  DRIVING: it is illegal to drive while under the influence of narcotics. Doing so is a DUI.  POTENTIAL SIDE EFFECTS: nausea, vomiting, itching, dizziness, drowsiness, dry mouth, constipation, and difficulty urinating.  POTENTIAL ADVERSE EFFECTS:  Opioid tolerance can develop with use of pain medications and this simply means that it requires more and more of the medication to control pain. However, this is seen more in patients that use opioids for longer periods of time.  Opioid dependence can develop with use of Opioids. People with opioid dependence will experience withdrawal symptoms upon cessation of the medication.  Opioid addiction can develop with use of Opioids. The incidence of this is very unlikely in patients who take the medications as ordered and stop the medications as instructed.  Opioid overdose can be dangerous, but is unlikely when the medication  is taken as ordered and stopped when ordered. It is important not to mix opioids with alcohol as this can lead to over sedation and respiratory difficulty.  DOSAGE:  After the initial surgical pain begins to resolve, you may begin to decrease the pain medication. By the end of a few weeks, you should be off of pain medications.  Refills will not be given by the office during evening hours, on weekends, or after 6 weeks post-op. You are responsible for weaning off of pain medication. You can increase the time between narcotic pills, taking one every 4 then 6 then 8 hours and so on.  To seek refills on pain medications during the initial 6-week post-operative period, you must call the office to request the refill. The office will then notify you when to  the prescription. DO NOT wait until you are out of the medication to request a refill. Prescriptions will not be filled over the weekend and depending on the schedule, it may take a couple days for the prescription to be available. Someone will have to pick the prescription in person at the office.    FOLLOW-UP VISITS  You will need to follow up in the office with your surgeon in 3 weeks, or as instructed elsewhere in your discharge paperwork. Please call this number [phone] to schedule this appointment. If you are going to an SNF/SNU facility, they will arrange for you to follow up in the office.  If you have any concerns or suspected complications prior to your follow up visit, please call the office. Do not wait until your appointment time if you suspect complications. These will need to be addressed in the office promptly.      Sheng Zamora II, MD  Orthopaedic Surgery  [facility]

## 2024-06-25 NOTE — PLAN OF CARE
Goal Outcome Evaluation:  Plan of Care Reviewed With: patient, spouse      Pt post op day 5 from a R hip with Dr. Zamora. Nausea controlled with polamen patch added today, and bowel regimen started. Vitals WDL and pain managed. Plan DC tomorrow.

## 2024-06-25 NOTE — PLAN OF CARE
Goal Outcome Evaluation:  Plan of Care Reviewed With: patient        Progress: no change  Outcome Evaluation: VSS, 2l NC, SL, up with assist of 2, voiding per BSC, pain tolerable, encouraged IS use, PT to eval, home on DC when stable

## 2024-06-25 NOTE — PROGRESS NOTES
Patient still with a fair amount of nausea.  She has not been able to mobilize as a result.  Plan likely home discharge tomorrow after nausea subsides and she has a chance to mobilize.  She still has a fair amount of pain this morning.  I think this will improve after the nausea subsides and she is able to mobilize.

## 2024-06-25 NOTE — THERAPY TREATMENT NOTE
Patient Name: FREDY Rivera  : 1945    MRN: [id number]                              Today's Date: 2024       Admit Date: 2024    Visit Dx:     ICD-10-CM ICD-9-CM   1. Status post right hip replacement  Z96.641 V43.64     Patient Active Problem List   Diagnosis    Colon cancer screening    Hip joint replacement status     Past Medical History:   Diagnosis Date    Elevated cholesterol     Restless leg syndrome     Skin cancer      Past Surgical History:   Procedure Laterality Date    BREAST SURGERY      BREAST IMPLANTS    CHOLECYSTECTOMY      COLONOSCOPY N/A 2017    Procedure: COLONOSCOPY;  Surgeon: Gayathri Yates MD;  Location: [facility] ENDOSCOPY;  Service:     COLONOSCOPY N/A 2022    Procedure: COLONOSCOPY;  Surgeon: Gayathri Yates MD;  Location: [facility] OR;  Service: Gastroenterology;  Laterality: N/A;  polyps, diverticulosis    SKIN CANCER EXCISION      TONSILLECTOMY      TOTAL HIP ARTHROPLASTY Right 2024    Procedure: TOTAL HIP ARTHROPLASTY ANTERIOR WITH HANA TABLE;  Surgeon: Sheng Zamora II, MD;  Location: [facility] OR [facility];  Service: Orthopedics;  Laterality: Right;    TUBAL ABDOMINAL LIGATION        General Information       Row Name 24 1428 24 1307       Physical Therapy Time and Intention    Document Type therapy note (daily note)  -MS evaluation  -MS    Mode of Treatment physical therapy  -MS physical therapy  -MS      Row Name 24 1428 24 1307       General Information    Patient Profile Reviewed -- yes  -MS    Prior Level of Function -- independent:;all household mobility  use of  RW, friend can help as needed  -MS    Existing Precautions/Restrictions fall  -MS fall  -MS    Barriers to Rehab -- none identified  -MS      Row Name 24 1307          Living Environment    People in Home friend(s)  -MS       Row Name 24 1307          Home Main Entrance    Number of Stairs, Main Entrance three  -MS     Stair Railings,  Main Entrance railings safe and in good condition  -MS       Row Name 06/25/24 1307          Stairs Within Home, Primary    Stairs, Within Home, Primary bedroom is on main level.  -MS       Row Name 06/25/24 1428 06/25/24 1307       Cognition    Orientation Status (Cognition) oriented x 4  -MS oriented x 4  -MS      Row Name 06/25/24 1428 06/25/24 1307       Safety Issues, Functional Mobility    Safety Issues Affecting Function (Mobility) -- awareness of need for assistance;insight into deficits/self-awareness;positioning of assistive device;sequencing abilities  -MS    Impairments Affecting Function (Mobility) balance;endurance/activity tolerance;strength;pain;postural/trunk control;range of motion (ROM)  -MS balance;endurance/activity tolerance;strength;pain;postural/trunk control;range of motion (ROM)  -MS    Comment, Safety Issues/Impairments (Mobility) Gait belt and non skid socks donned.  -MS Gait belt and non skid socks donned.  -MS              User Key  (r) = Recorded By, (t) = Taken By, (c) = Cosigned By      Initials Name Provider Type    Sofy Stein, PT Physical Therapist                   Mobility       Row Name 06/25/24 1428 06/25/24 1308       Bed Mobility    Bed Mobility supine-sit;sit-supine  -MS supine-sit;sit-supine  -MS    Supine-Sit [place] (Bed Mobility) standby assist;verbal cues;nonverbal cues (demo/gesture)  -MS contact guard;verbal cues;nonverbal cues (demo/gesture)  -MS    Sit-Supine [place] (Bed Mobility) minimum assist (75% patient effort);verbal cues;nonverbal cues (demo/gesture)  -MS --    Assistive Device (Bed Mobility) bed rails;head of bed elevated  -MS bed rails;head of bed elevated  -MS    Comment, (Bed Mobility) Incr time needed. Heavy reliance on bedrails.  -MS SV-SBA for sitting balance. Encouragement needed.  -MS      Row Name 06/25/24 1308          Transfers    Comment, (Transfers) Sequencing and hand placement cues.  -MS       Row Name 06/25/24 1428  06/25/24 1308       Sit-Stand Transfer    Sit-Stand [place] (Transfers) contact guard;verbal cues;nonverbal cues (demo/gesture)  -MS contact guard;verbal cues;nonverbal cues (demo/gesture)  -MS    Assistive Device (Sit-Stand Transfers) walker, front-wheeled  -MS walker, front-wheeled  -MS      Row Name 06/25/24 1428 06/25/24 1308       Gait/Stairs (Locomotion)    [place] Level (Gait) contact guard;verbal cues;nonverbal cues (demo/gesture)  -MS contact guard;verbal cues;nonverbal cues (demo/gesture)  -MS    Assistive Device (Gait) walker, front-wheeled  -MS walker, front-wheeled  -MS    Patient was able to Ambulate yes  -MS yes  -MS    Distance in Feet (Gait) 65  -MS 25  -MS    Deviations/Abnormal Patterns (Gait) wilfrid decreased;gait speed decreased;antalgic  -MS wilfrid decreased;gait speed decreased;antalgic  -MS    Bilateral Gait Deviations forward flexed posture  -MS forward flexed posture  -MS    Comment, (Gait/Stairs) Pain limiting.  -MS Nausea and pain limiting. Agreeable to sitting [facility] post PT.  -MS      Row Name 06/25/24 1428 06/25/24 1308       Mobility    Extremity Weight-bearing Status right lower extremity  -MS right lower extremity  -MS    Right Lower Extremity (Weight-bearing Status) weight-bearing as tolerated (WBAT)  -MS weight-bearing as tolerated (WBAT)  -MS              User Key  (r) = Recorded By, (t) = Taken By, (c) = Cosigned By      Initials Name Provider Type    MS Sofy Chavez, PT Physical Therapist                   Obj/Interventions       Row Name 06/25/24 1309          Range of Motion Comprehensive    Comment, General Range of Motion R LE limited d/t soreness  -MS       Row Name 06/25/24 1309          Strength Comprehensive (MMT)    Comment, General Manual Muscle Testing (MMT) Assessment R LE post op weakness  -MS       Row Name 06/25/24 1309          Motor Skills    Therapeutic Exercise --  R ANTONI protocol x 10 reps, incr time required.  -MS       Row Name 06/25/24 5913  06/25/24 1309       Balance    Balance Assessment -- sitting static balance;standing static balance  -MS    Static Sitting Balance supervision  -MS standby assist  -MS    Position, Sitting Balance sitting edge of bed  -MS sitting edge of bed  -MS    Static Standing Balance contact guard  -MS contact guard  -MS    Position/Device Used, Standing Balance supported;walker, rolling  -MS supported;walker, rolling  -MS      Row Name 06/25/24 1309          Sensory Assessment (Somatosensory)    Sensory Assessment (Somatosensory) sensation intact  -MS               User Key  (r) = Recorded By, (t) = Taken By, (c) = Cosigned By      Initials Name Provider Type    MS ScottSofy, PT Physical Therapist                   Goals/Plan       Row Name 06/25/24 1312          Bed Mobility Goal 1 (PT)    Activity/Assistive Device (Bed Mobility Goal 1, PT) bed mobility activities, all  -MS     [place] Level/Cues Needed (Bed Mobility Goal 1, PT) supervision required  -MS     Time Frame (Bed Mobility Goal 1, PT) 1 week  -MS       Row Name 06/25/24 1312          Transfer Goal 1 (PT)    Activity/Assistive Device (Transfer Goal 1, PT) transfers, all;walker, rolling  -MS     [place] Level/Cues Needed (Transfer Goal 1, PT) supervision required  -MS     Time Frame (Transfer Goal 1, PT) 1 week  -MS       Row Name 06/25/24 1312          Gait Training Goal 1 (PT)    Activity/Assistive Device (Gait Training Goal 1, PT) gait (walking locomotion);walker, rolling  -MS     [place] Level (Gait Training Goal 1, PT) supervision required  -MS     Distance (Gait Training Goal 1, PT) 150  -MS     Time Frame (Gait Training Goal 1, PT) 1 week  -MS       Row Name 06/25/24 1312          Therapy Assessment/Plan (PT)    Planned Therapy Interventions (PT) balance training;bed mobility training;gait training;home exercise program;postural re-education;patient/family education;ROM (range of motion);stair training;strengthening;transfer training   -MS               User Key  (r) = Recorded By, (t) = Taken By, (c) = Cosigned By      Initials Name Provider Type    MS ChavezSofy, PT Physical Therapist                   Clinical Impression       Row Name 06/25/24 1429 06/25/24 1311       Pain    Pretreatment Pain Rating 6/10  -MS 5/10  -MS    Posttreatment Pain Rating 6/10  -MS 5/10  -MS    Pain Location - Side/Orientation Right  -MS Right  -MS    Pain Location incisional  -MS lower  -MS    Pain Location - hip  -MS extremity  -MS    Pain Intervention(s) Repositioned;Ambulation/increased activity;Rest  -MS Repositioned;Ambulation/increased activity;Rest  -MS      Row Name 06/25/24 1311          Therapy Assessment/Plan (PT)    Rehab Potential (PT) good, to achieve stated therapy goals  -MS     Criteria for Skilled Interventions Met (PT) yes;meets criteria  -MS     Therapy Frequency (PT) daily  -MS       Row Name 06/25/24 1311          Vital Signs    O2 Delivery Pre Treatment room air  -MS       Row Name 06/25/24 1429 06/25/24 1311       Positioning and Restraints    Pre-Treatment Position in bed  -MS in bed  -MS    Post Treatment Position bed  -MS chair  -MS    In Bed notified nsg;fowlers;call light within reach;encouraged to call for assist;exit alarm on  -MS --    In Chair -- notified nsg;reclined;call light within reach;encouraged to call for assist;exit alarm on;with family/caregiver  -MS              User Key  (r) = Recorded By, (t) = Taken By, (c) = Cosigned By      Initials Name Provider Type    MS ChavezSofy, PT Physical Therapist                   Outcome Measures       Row Name 06/25/24 1312 06/25/24 0801       How much help from another person do you currently need...    Turning from your back to your side while in flat bed without using bedrails? 3  -MS 3  -MN (r) CB (t) MN (c)    Moving from lying on back to sitting on the side of a flat bed without bedrails? 3  -MS 3  -MN (r) CB (t) MN (c)    Moving to and from a bed to a chair  (including a wheelchair)? 3  -MS 3  -MN (r) CB (t) MN (c)    Standing up from a chair using your arms (e.g., wheelchair, bedside chair)? 3  -MS 3  -MN (r) CB (t) MN (c)    Climbing 3-5 steps with a railing? 2  -MS 3  -MN (r) CB (t) MN (c)    To walk in hospital room? 3  -MS 3  -MN (r) CB (t) MN (c)    AM-PAC 6 Clicks Score (PT) 17  -MS 18  -MN (r) CB (t)    Highest Level of Mobility Goal 5 --> Static standing  -MS 6 --> Walk 10 steps or more  -MN (r) CB (t)              User Key  (r) = Recorded By, (t) = Taken By, (c) = Cosigned By      Initials Name Provider Type    Falguni Rodrigues RN Registered Nurse    Sofy Stein, PT Physical Therapist    Jhonatan Ornelas RN Extern Registered Nurse                                 Physical Therapy Education       Title: PT OT SLP Therapies (Done)       Topic: Physical Therapy (Done)       Point: Mobility training (Done)       Learning Progress Summary             Patient Acceptance, E,TB, VU by MS at 6/25/2024 1312                         Point: Home exercise program (Done)       Learning Progress Summary             Patient Acceptance, E,TB, VU by MS at 6/25/2024 1312                         Point: Body mechanics (Done)       Learning Progress Summary             Patient Acceptance, E,TB, VU by MS at 6/25/2024 1312                         Point: Precautions (Done)       Learning Progress Summary             Patient Acceptance, E,TB, VU by MS at 6/25/2024 1312                                         User Key       Initials Effective Dates Name Provider Type Discipline    MS 06/16/21 -  Sofy Chavez, PT Physical Therapist PT                  PT Recommendation and Plan  Planned Therapy Interventions (PT): balance training, bed mobility training, gait training, home exercise program, postural re-education, patient/family education, ROM (range of motion), stair training, strengthening, transfer training        Time Calculation:         PT Charges       Row Name  06/25/24 1428 06/25/24 1307          Time Calculation    Start Time 1401  -MS 0812  -MS     Stop Time 1412  -MS 0830  -MS     Time Calculation (min) 11 min  -MS 18 min  -MS     PT Received On 06/25/24  -MS 06/25/24  -MS     PT - Next Appointment 06/26/24  -MS 06/26/24  -MS     PT Goal Re-Cert Due Date -- 06/28/24  -MS               User Key  (r) = Recorded By, (t) = Taken By, (c) = Cosigned By      Initials Name Provider Type    Sofy Stein, PT Physical Therapist                  Therapy Charges for Today       Code Description Service Date Service Provider Modifiers Qty    94079122498 HC PT EVAL LOW COMPLEXITY 2 6/25/2024 Sofy Chavez, PT GP 1    77524518390 HC PT THER PROC EA 15 MIN 6/25/2024 Sofy Chavez, PT GP 1    16886327713 HC PT THER PROC EA 15 MIN 6/25/2024 Sofy Chavez, PT GP 1            PT G-Codes  AM-PAC 6 Clicks Score (PT): 17  PT Discharge Summary  Anticipated Discharge Disposition (PT): home with assist, home with home health    Sofy Chavez, RYLAN  6/25/2024

## 2024-06-26 ENCOUNTER — APPOINTMENT (OUTPATIENT)
Dept: GENERAL RADIOLOGY | Facility: HOSPITAL | Age: 79
End: 2024-06-26
Payer: MEDICARE

## 2024-06-26 ENCOUNTER — APPOINTMENT (OUTPATIENT)
Dept: CARDIOLOGY | Facility: HOSPITAL | Age: 79
End: 2024-06-26
Payer: MEDICARE

## 2024-06-26 PROBLEM — R11.2 POSTOPERATIVE NAUSEA AND VOMITING: Status: ACTIVE | Noted: 2024-06-26

## 2024-06-26 PROBLEM — Z98.890 POSTOPERATIVE NAUSEA AND VOMITING: Status: ACTIVE | Noted: 2024-06-26

## 2024-06-26 PROBLEM — R01.1 CARDIAC MURMUR: Status: ACTIVE | Noted: 2024-06-26

## 2024-06-26 LAB
ALBUMIN SERPL-MCNC: 3.4 G/DL (ref 3.5–5.2)
ALBUMIN/GLOB SERPL: 1.3 G/DL
ALP SERPL-CCNC: 73 U/L (ref 39–117)
ALT SERPL W P-5'-P-CCNC: 14 U/L (ref 1–33)
ANION GAP SERPL CALCULATED.3IONS-SCNC: 10.9 MMOL/L (ref 5–15)
AORTIC DIMENSIONLESS INDEX: 0.9 (DI)
AST SERPL-CCNC: 27 U/L (ref 1–32)
BH CV ECHO MEAS - AI P1/2T: 461 MSEC
BH CV ECHO MEAS - AO MAX PG: 19.7 MMHG
BH CV ECHO MEAS - AO MEAN PG: 9 MMHG
BH CV ECHO MEAS - AO ROOT AREA (BSA CORRECTED): 4.3 CM2
BH CV ECHO MEAS - AO ROOT DIAM: 3.9 CM
BH CV ECHO MEAS - AO V2 MAX: 221.6 CM/SEC
BH CV ECHO MEAS - AO V2 VTI: 43.1 CM
BH CV ECHO MEAS - AVA(I,D): 2.5 CM2
BH CV ECHO MEAS - EDV(CUBED): 120.1 ML
BH CV ECHO MEAS - EDV(MOD-SP2): 62 ML
BH CV ECHO MEAS - EDV(MOD-SP4): 56 ML
BH CV ECHO MEAS - EF(MOD-BP): 63.1 %
BH CV ECHO MEAS - EF(MOD-SP2): 64.5 %
BH CV ECHO MEAS - EF(MOD-SP4): 60.7 %
BH CV ECHO MEAS - ESV(CUBED): 32.2 ML
BH CV ECHO MEAS - ESV(MOD-SP2): 22 ML
BH CV ECHO MEAS - ESV(MOD-SP4): 22 ML
BH CV ECHO MEAS - FS: 35.5 %
BH CV ECHO MEAS - IVS/LVPW: 0.83 CM
BH CV ECHO MEAS - IVSD: 0.94 CM
BH CV ECHO MEAS - LAT PEAK E' VEL: 7.6 CM/SEC
BH CV ECHO MEAS - LV DIASTOLIC VOL/BSA (35-75): 27.7 CM2
BH CV ECHO MEAS - LV MASS(C)D: 185.8 GRAMS
BH CV ECHO MEAS - LV MAX PG: 11.4 MMHG
BH CV ECHO MEAS - LV MEAN PG: 6 MMHG
BH CV ECHO MEAS - LV SYSTOLIC VOL/BSA (12-30): 10.9 CM2
BH CV ECHO MEAS - LV V1 MAX: 169 CM/SEC
BH CV ECHO MEAS - LV V1 VTI: 33.5 CM
BH CV ECHO MEAS - LVIDD: 4.9 CM
BH CV ECHO MEAS - LVIDS: 3.2 CM
BH CV ECHO MEAS - LVOT AREA: 3.2 CM2
BH CV ECHO MEAS - LVOT DIAM: 2.02 CM
BH CV ECHO MEAS - LVPWD: 1.13 CM
BH CV ECHO MEAS - MED PEAK E' VEL: 8.6 CM/SEC
BH CV ECHO MEAS - MR MAX PG: 50.7 MMHG
BH CV ECHO MEAS - MR MAX VEL: 356.2 CM/SEC
BH CV ECHO MEAS - MV A DUR: 0.09 SEC
BH CV ECHO MEAS - MV A MAX VEL: 145.9 CM/SEC
BH CV ECHO MEAS - MV DEC SLOPE: 370.9 CM/SEC2
BH CV ECHO MEAS - MV DEC TIME: 0.25 SEC
BH CV ECHO MEAS - MV E MAX VEL: 97.5 CM/SEC
BH CV ECHO MEAS - MV E/A: 0.67
BH CV ECHO MEAS - MV MAX PG: 9.5 MMHG
BH CV ECHO MEAS - MV MEAN PG: 2.5 MMHG
BH CV ECHO MEAS - MV P1/2T: 78.9 MSEC
BH CV ECHO MEAS - MV V2 VTI: 26.7 CM
BH CV ECHO MEAS - MVA(P1/2T): 2.8 CM2
BH CV ECHO MEAS - MVA(VTI): 4 CM2
BH CV ECHO MEAS - PULM A REVS DUR: 0.1 SEC
BH CV ECHO MEAS - PULM A REVS VEL: 40.4 CM/SEC
BH CV ECHO MEAS - PULM DIAS VEL: 38.8 CM/SEC
BH CV ECHO MEAS - PULM S/D: 1.34
BH CV ECHO MEAS - PULM SYS VEL: 51.9 CM/SEC
BH CV ECHO MEAS - RAP SYSTOLE: 3 MMHG
BH CV ECHO MEAS - RVSP: 37.4 MMHG
BH CV ECHO MEAS - SV(LVOT): 107.7 ML
BH CV ECHO MEAS - SV(MOD-SP2): 40 ML
BH CV ECHO MEAS - SV(MOD-SP4): 34 ML
BH CV ECHO MEAS - SVI(LVOT): 53.3 ML/M2
BH CV ECHO MEAS - SVI(MOD-SP2): 19.8 ML/M2
BH CV ECHO MEAS - SVI(MOD-SP4): 16.8 ML/M2
BH CV ECHO MEAS - TAPSE (>1.6): 2.08 CM
BH CV ECHO MEAS - TR MAX PG: 34.4 MMHG
BH CV ECHO MEAS - TR MAX VEL: 293.2 CM/SEC
BH CV ECHO MEASUREMENTS AVERAGE E/E' RATIO: 12.04
BH CV XLRA - RV BASE: 3 CM
BH CV XLRA - TDI S': 13.1 CM/SEC
BILIRUB SERPL-MCNC: 0.5 MG/DL (ref 0–1.2)
BUN SERPL-MCNC: 18 MG/DL (ref 8–23)
BUN/CREAT SERPL: 24.3 (ref 7–25)
CALCIUM SPEC-SCNC: 8.7 MG/DL (ref 8.6–10.5)
CHLORIDE SERPL-SCNC: 102 MMOL/L (ref 98–107)
CO2 SERPL-SCNC: 25.1 MMOL/L (ref 22–29)
CREAT SERPL-MCNC: 0.74 MG/DL (ref 0.57–1)
EGFRCR SERPLBLD CKD-EPI 2021: 82.4 ML/MIN/1.73
GLOBULIN UR ELPH-MCNC: 2.7 GM/DL
GLUCOSE SERPL-MCNC: 119 MG/DL (ref 65–99)
HCT VFR BLD AUTO: 30.7 % (ref 34–46.6)
HGB BLD-MCNC: 10.1 G/DL (ref 12–15.9)
LEFT ATRIUM VOLUME INDEX: 17.4 ML/M2
POTASSIUM SERPL-SCNC: 4.2 MMOL/L (ref 3.5–5.2)
PROT SERPL-MCNC: 6.1 G/DL (ref 6–8.5)
SODIUM SERPL-SCNC: 138 MMOL/L (ref 136–145)

## 2024-06-26 PROCEDURE — 93306 TTE W/DOPPLER COMPLETE: CPT | Performed by: INTERNAL MEDICINE

## 2024-06-26 PROCEDURE — 93306 TTE W/DOPPLER COMPLETE: CPT

## 2024-06-26 PROCEDURE — A9270 NON-COVERED ITEM OR SERVICE: HCPCS | Performed by: ORTHOPAEDIC SURGERY

## 2024-06-26 PROCEDURE — 63710000001 ASPIRIN 81 MG TABLET DELAYED-RELEASE: Performed by: ORTHOPAEDIC SURGERY

## 2024-06-26 PROCEDURE — 63710000001 ROPINIROLE 2 MG TABLET: Performed by: ORTHOPAEDIC SURGERY

## 2024-06-26 PROCEDURE — 63710000001 OXYCODONE-ACETAMINOPHEN 5-325 MG TABLET: Performed by: ORTHOPAEDIC SURGERY

## 2024-06-26 PROCEDURE — A9270 NON-COVERED ITEM OR SERVICE: HCPCS | Performed by: HOSPITALIST

## 2024-06-26 PROCEDURE — 97110 THERAPEUTIC EXERCISES: CPT

## 2024-06-26 PROCEDURE — G0378 HOSPITAL OBSERVATION PER HR: HCPCS

## 2024-06-26 PROCEDURE — 63710000001 MECLIZINE 25 MG TABLET: Performed by: HOSPITALIST

## 2024-06-26 PROCEDURE — 85014 HEMATOCRIT: CPT | Performed by: ORTHOPAEDIC SURGERY

## 2024-06-26 PROCEDURE — 25010000002 ONDANSETRON PER 1 MG: Performed by: ORTHOPAEDIC SURGERY

## 2024-06-26 PROCEDURE — 97116 GAIT TRAINING THERAPY: CPT

## 2024-06-26 PROCEDURE — 74018 RADEX ABDOMEN 1 VIEW: CPT

## 2024-06-26 PROCEDURE — 80053 COMPREHEN METABOLIC PANEL: CPT | Performed by: HOSPITALIST

## 2024-06-26 PROCEDURE — 25810000003 SODIUM CHLORIDE 0.9 % SOLUTION: Performed by: HOSPITALIST

## 2024-06-26 PROCEDURE — 63710000001 HYDROCODONE-ACETAMINOPHEN 5-325 MG TABLET: Performed by: HOSPITALIST

## 2024-06-26 PROCEDURE — 85018 HEMOGLOBIN: CPT | Performed by: ORTHOPAEDIC SURGERY

## 2024-06-26 PROCEDURE — 63710000001 FAMOTIDINE 20 MG TABLET: Performed by: ORTHOPAEDIC SURGERY

## 2024-06-26 RX ORDER — HYDROCODONE BITARTRATE AND ACETAMINOPHEN 5; 325 MG/1; MG/1
1 TABLET ORAL EVERY 4 HOURS PRN
Status: DISCONTINUED | OUTPATIENT
Start: 2024-06-26 | End: 2024-06-27 | Stop reason: HOSPADM

## 2024-06-26 RX ORDER — MECLIZINE HYDROCHLORIDE 25 MG/1
25 TABLET ORAL 3 TIMES DAILY PRN
Status: DISCONTINUED | OUTPATIENT
Start: 2024-06-26 | End: 2024-06-27 | Stop reason: HOSPADM

## 2024-06-26 RX ADMIN — ASPIRIN 81 MG: 81 TABLET, COATED ORAL at 20:20

## 2024-06-26 RX ADMIN — ROPINIROLE 2 MG: 2 TABLET, FILM COATED ORAL at 20:20

## 2024-06-26 RX ADMIN — FAMOTIDINE 40 MG: 20 TABLET, FILM COATED ORAL at 09:21

## 2024-06-26 RX ADMIN — ASPIRIN 81 MG: 81 TABLET, COATED ORAL at 09:22

## 2024-06-26 RX ADMIN — SODIUM CHLORIDE 500 ML: 9 INJECTION, SOLUTION INTRAVENOUS at 15:00

## 2024-06-26 RX ADMIN — HYDROCODONE BITARTRATE AND ACETAMINOPHEN 1 TABLET: 5; 325 TABLET ORAL at 20:20

## 2024-06-26 RX ADMIN — MECLIZINE HYDROCHLORIDE 25 MG: 25 TABLET ORAL at 16:22

## 2024-06-26 RX ADMIN — ONDANSETRON 4 MG: 2 INJECTION INTRAMUSCULAR; INTRAVENOUS at 09:22

## 2024-06-26 RX ADMIN — OXYCODONE HYDROCHLORIDE AND ACETAMINOPHEN 1 TABLET: 5; 325 TABLET ORAL at 04:54

## 2024-06-26 NOTE — THERAPY TREATMENT NOTE
Patient Name: FREDY Rivera  : 1945    MRN: [id number]                              Today's Date: 2024       Admit Date: 2024    Visit Dx:     ICD-10-CM ICD-9-CM   1. Status post right hip replacement  Z96.641 V43.64     Patient Active Problem List   Diagnosis    Colon cancer screening    Hip joint replacement status     Past Medical History:   Diagnosis Date    Elevated cholesterol     Restless leg syndrome     Skin cancer      Past Surgical History:   Procedure Laterality Date    BREAST SURGERY      BREAST IMPLANTS    CHOLECYSTECTOMY      COLONOSCOPY N/A 2017    Procedure: COLONOSCOPY;  Surgeon: Gayathri Yates MD;  Location: [facility] ENDOSCOPY;  Service:     COLONOSCOPY N/A 2022    Procedure: COLONOSCOPY;  Surgeon: Gayathri Yates MD;  [place] [facility] [place];  Service: Gastroenterology;  Laterality: N/A;  polyps, diverticulosis    SKIN CANCER EXCISION      TONSILLECTOMY      TOTAL HIP ARTHROPLASTY Right 2024    Procedure: TOTAL HIP ARTHROPLASTY ANTERIOR WITH HANA TABLE;  Surgeon: Sheng Zamora II, MD;  [place] [facility] [place] [facility] [place]  Service: Orthopedics;  Laterality: Right;    TUBAL ABDOMINAL LIGATION        General Information       Row Name 24 1005          Physical Therapy Time and Intention    Document Type therapy note (daily note)  -     Mode of Treatment physical therapy  -       Row Name 24 1005          General Information    Existing Precautions/Restrictions fall  -       Row Name 24 1005          Cognition    Orientation Status (Cognition) oriented x 4  -               User Key  (r) = Recorded By, (t) = Taken By, (c) = Cosigned By      Initials Name Provider Type     Ludy Parsons PTA Physical Therapist Assistant                   Mobility       Row Name 24 1005          Bed Mobility    Bed Mobility supine-sit;sit-supine  -     Supine-Sit [place] (Bed Mobility) minimum assist (75% patient effort)  -      Sit-Supine [place] (Bed Mobility) moderate assist (50% patient effort)  -     Assistive Device (Bed Mobility) bed rails;head of bed elevated  -     Comment, (Bed Mobility) assist needed w/ R LE only  -       Row Name 06/26/24 1005          Sit-Stand Transfer    Sit-Stand [place] (Transfers) contact guard  -     Assistive Device (Sit-Stand Transfers) walker, front-wheeled  -     Comment, (Sit-Stand Transfer) cues for hand placement  -       Row Name 06/26/24 1005          Gait/Stairs (Locomotion)    [place] Level (Gait) contact guard;standby assist  -     Assistive Device (Gait) walker, front-wheeled  -     Patient was able to Ambulate yes  -     Distance in Feet (Gait) 35  -SM     Deviations/Abnormal Patterns (Gait) antalgic;wilfrid decreased;stride length decreased  -     Bilateral Gait Deviations forward flexed posture  -     Right Sided Gait Deviations weight shift ability decreased  -     Comment, (Gait/Stairs) limited d/t pain and nausea  -               User Key  (r) = Recorded By, (t) = Taken By, (c) = Cosigned By      Initials Name Provider Type    Ludy Berry PTA Physical Therapist Assistant                   Obj/Interventions       Row Name 06/26/24 1007          Motor Skills    Therapeutic Exercise --  R THR protocol x10 reps; some assist required for heel slides and hip abd/add  -               User Key  (r) = Recorded By, (t) = Taken By, (c) = Cosigned By      Initials Name Provider Type    Ludy Berry PTA Physical Therapist Assistant                   Goals/Plan    No documentation.                  Clinical Impression       Row Name 06/26/24 1007          Pain    Pretreatment Pain Rating 5/10  -SM     Posttreatment Pain Rating 7/10  -     Pain Location - Side/Orientation Right  -     Pain Location - hip  -SM     Pain Intervention(s) Repositioned;Ambulation/increased activity;Rest  -       Row Name 06/26/24 1007           Positioning and Restraints    Pre-Treatment Position in bed  -SM     Post Treatment Position bed  -SM     In Bed supine;call light within reach;encouraged to call for assist;exit alarm on;notified nsg  -               User Key  (r) = Recorded By, (t) = Taken By, (c) = Cosigned By      Initials Name Provider Type    Ludy Berry PTA Physical Therapist Assistant                   Outcome Measures       Row Name 06/26/24 1009          How much help from another person do you currently need...    Turning from your back to your side while in flat bed without using bedrails? 3  -SM     Moving from lying on back to sitting on the side of a flat bed without bedrails? 3  -SM     Moving to and from a bed to a chair (including a wheelchair)? 3  -SM     Standing up from a chair using your arms (e.g., wheelchair, bedside chair)? 3  -SM     Climbing 3-5 steps with a railing? 3  -SM     To walk in hospital room? 3  -SM     AM-PAC 6 Clicks Score (PT) 18  -     Highest Level of Mobility Goal 6 --> Walk 10 steps or more  -       Row Name 06/26/24 1009          Functional Assessment    Outcome Measure Options AM-PAC 6 Clicks Basic Mobility (PT)  -               User Key  (r) = Recorded By, (t) = Taken By, (c) = Cosigned By      Initials Name Provider Type    Ludy Berry PTA Physical Therapist Assistant                                 Physical Therapy Education       Title: PT OT SLP Therapies (Done)       Topic: Physical Therapy (Done)       Point: Mobility training (Done)       Learning Progress Summary             Patient Acceptance, E,TB,D, VU,NR by  at 6/26/2024 1010    Acceptance, E,TB, VU by MS at 6/25/2024 1312                         Point: Home exercise program (Done)       Learning Progress Summary             Patient Acceptance, E,TB,D, VU,NR by  at 6/26/2024 1010    Acceptance, E,TB, VU by MS at 6/25/2024 1312                         Point: Body mechanics (Done)       Learning Progress  "Summary             Patient Acceptance, E,TB,D, VU,NR by  at 6/26/2024 1010    Acceptance, E,TB, VU by MS at 6/25/2024 1312                         Point: Precautions (Done)       Learning Progress Summary             Patient Acceptance, E,TB,D, VU,NR by  at 6/26/2024 1010    Acceptance, E,TB, VU by MS at 6/25/2024 1312                                         User Key       Initials Effective Dates Name Provider Type Discipline     03/07/18 -  Ludy Parsons PTA Physical Therapist Assistant PT    MS 06/16/21 -  Sofy Chavez PT Physical Therapist PT                  PT Recommendation and Plan     Plan of Care Reviewed With: patient  Progress: improving  Outcome Evaluation: Pt tolerated treatment fair this date. Required min-mod A for bed mobility, assist only needed for R LE d/t weakness and pain. Pt stood w/ CGA, then ambulated 35ft w/ Rw and SBA-CGA. Limited overall d/t pain, nausea, and weakness. Pt completed R hip exercises, w/ assist needed for heel slides and hip abd/add. LAURA was flashing green initially during session, though by the end was flashing red/orange and dressing was completely saturated. RN was notified. Discussed d/c plan w/ pt d/t current progress and concern as pt states she lives alone and \"Lanre\" who is helping is unsteady and at falls risk per pt. Recommended pt consider having someone else assist at home especially w/ bed mobility, or consider SNF.     Time Calculation:         PT Charges       Row Name 06/26/24 1019             Time Calculation    Start Time 0848  -      Stop Time 0915  -      Time Calculation (min) 27 min  -      PT Received On 06/26/24  -      PT - Next Appointment 06/27/24  -                User Key  (r) = Recorded By, (t) = Taken By, (c) = Cosigned By      Initials Name Provider Type     Ludy Parsons PTA Physical Therapist Assistant                  Therapy Charges for Today       Code Description Service Date Service Provider " Modifiers Qty    30463504334 HC GAIT TRAINING EA 15 MIN 6/26/2024 Ludy Parsons, PTA GP 1    84789016988 HC PT THER PROC EA 15 MIN 6/26/2024 Ludy Parsons, SHADE GP 1            PT G-Codes  Outcome Measure Options: AM-PAC 6 Clicks Basic Mobility (PT)  AM-PAC 6 Clicks Score (PT): 18  PT Discharge Summary  Anticipated Discharge Disposition (PT): home with home health, home with assist, skilled nursing facility    Ludy Parsons PTA  6/26/2024

## 2024-06-26 NOTE — CONSULTS
Patient Name:  FREDY Rivera  YOB: 1945  MRN:  [id number]  Date of Admission:  6/24/2024  Date of Consult:  6/26/2024  Patient Care Team:  Jose Kay MD as PCP - General (Internal Medicine)    Inpatient Hospitalist Consult  Consult performed by: Temo Fishman MD  Consult ordered by: Sheng Zamora II, MD  Reason for consult: Evaluate status and make recommendations regarding treatment for postoperative nausea        Subjective   History of Present Illness  Ms. Rivera is a 79 y.o. female that has been admitted to [facility] following elective TOTAL HIP ARTHROPLASTY ANTERIOR WITH HANA TABLE.  She has been admitted to an orthopedic floor following surgery and we were asked to see her regarding persistent postoperative nausea.  Symptoms started pretty much right after surgery.  Yesterday she had severe nausea and vomiting.  Today the vomiting has subsided but she still very nauseous.  She says it is worse when she stands up.  It is associated with some dizziness.  She has chronic ringing in her ears but no hearing loss.  No facial asymmetry, slurring of her speech or unilateral muscle weakness.  No abdominal distention or discomfort.      Past Medical History:   Diagnosis Date    Elevated cholesterol     Restless leg syndrome     Skin cancer      Past Surgical History:   Procedure Laterality Date    BREAST SURGERY      BREAST IMPLANTS    CHOLECYSTECTOMY      COLONOSCOPY N/A 04/28/2017    Procedure: COLONOSCOPY;  Surgeon: Gayathri Yates MD;  Location: [facility] ENDOSCOPY;  Service:     COLONOSCOPY N/A 04/25/2022    Procedure: COLONOSCOPY;  Surgeon: Gayathri Yates MD;  Location: [facility] OR;  Service: Gastroenterology;  Laterality: N/A;  polyps, diverticulosis    SKIN CANCER EXCISION      TONSILLECTOMY      TOTAL HIP ARTHROPLASTY Right 6/24/2024    Procedure: TOTAL HIP ARTHROPLASTY ANTERIOR WITH HANA TABLE;  Surgeon: Sheng Zamora II,  MD;  Location: [facility] [place];  Service: Orthopedics;  Laterality: Right;    TUBAL ABDOMINAL LIGATION       Family History   Problem Relation Age of Onset    Colon cancer Mother     Malig Hyperthermia Neg Hx      Social History     Tobacco Use    Smoking status: Never    Smokeless tobacco: Never   Vaping Use    Vaping status: Never Used   Substance Use Topics    Alcohol use: No    Drug use: No     Medications Prior to Admission   Medication Sig Dispense Refill Last Dose    HYDROcodone-acetaminophen (NORCO) 5-325 MG per tablet Take 1 tablet by mouth Every 6 (Six) Hours As Needed. for pain   6/21/2024    ibuprofen (ADVIL,MOTRIN) 600 MG tablet HOLD FOR SURGERY PER MD INSTRUCTIONS   6/21/2024    loratadine (Claritin) 10 MG tablet Take 1 tablet by mouth Daily.   6/21/2024    rOPINIRole (REQUIP) 1 MG tablet TAKE ONE TABLET BY MOUTH  PM, THEN TAKE TWO TABLETS BY MOUTH AT BEDTIME   6/21/2024    rosuvastatin (CRESTOR) 40 MG tablet Take 1 tablet by mouth Daily.   6/21/2024     Allergies:  Patient has no known allergies.    Review of Systems   HENT:  Positive for tinnitus.    Cardiovascular:  Negative for chest pain.   Gastrointestinal:  Positive for nausea and vomiting. Negative for abdominal pain.   Neurological:  Positive for dizziness.   Psychiatric/Behavioral:  Negative for confusion.        Objective      Vital Signs  Temp:  [98.1 °F (36.7 °C)-100.7 °F (38.2 °C)] 99 °F (37.2 °C)  Heart Rate:  [67-75] 73  Resp:  [16] 16  BP: ()/(53-73) 113/58  Body mass index is 31.46 kg/m².    Physical Exam  Vitals and nursing note reviewed.   Constitutional:       Appearance: She is ill-appearing.   Eyes:      General: No scleral icterus.  Neck:      Comments: Nystagmus noted  Cardiovascular:      Rate and Rhythm: Normal rate and regular rhythm.      Heart sounds: Murmur heard.   Pulmonary:      Effort: Pulmonary effort is normal.      Breath sounds: Normal breath sounds.   Abdominal:      General: Bowel sounds are normal.       Palpations: Abdomen is soft.      Tenderness: There is no abdominal tenderness.   Musculoskeletal:         General: Tenderness present.      Comments: Surgical wound dressed, ROM not tested   Skin:     General: Skin is warm and dry.      Findings: No rash.   Neurological:      Mental Status: She is alert. Mental status is at baseline.         Results Review:   I reviewed the patient's new clinical results.  I reviewed the patient's new imaging results and agree with the interpretation.  I reviewed the patient's other test results and agree with the interpretation         Assessment/Plan     Active Hospital Problems    Diagnosis  POA    **Hip joint replacement status [Z96.649]  Not Applicable    Postoperative nausea and vomiting [R11.2, Z98.890]  No    Cardiac murmur [R01.1]  Yes      Resolved Hospital Problems   No resolved problems to display.       Ms. Rivera is a 79 y.o. female who is s/p TOTAL HIP ARTHROPLASTY ANTERIOR WITH HANA TABLE.    Abdominal x-ray was negative.  Her exam is benign and I do not think nausea is GI related.  Certainly no evidence of ileus or obstruction.  This may be related to anesthesia and/or narcotic pain medication.  It sounds like symptoms are improved today compared to yesterday but she still not able to eat.  Will change Percocet over to [place].  Her symptoms are worse with standing.  Will check orthostatic blood pressure and give bolus of fluid if abnormal.  Neurologically she seems intact and has no significant risk factors for stroke.  Consider head CT if symptoms persist tomorrow.  Could have vertiginous component.  Will try some meclizine.  Would hold off on discharge today but could possibly discharge tomorrow if feels better and tolerating a diet.    She has a cardiac murmur that she does not feel she has been told about in the past.  Unlikely related to her nausea but will get echocardiogram.    Will check CMP      DVT prophylaxis per surgery.  She was encouraged to use  incentive spirometer as instructed.      Thank you very much for asking [facility] to be involved in this patient's care. We will follow along with you.      Temo Fishman MD  [facility]  06/26/24  12:32 EDT

## 2024-06-26 NOTE — PLAN OF CARE
Goal Outcome Evaluation:              Outcome Evaluation: POD 2 RTH ant, A&O, VSS, RA, assist x1, voiding per brp, [facility] consulted for nausea-nausea has been better as the shift went on, bolus given, orthostatics ordered daily, meclizine ordered for dizziness, KUB negative, echo completed, possible d/c tomorrow if feeling better, educated on bp monitoring, CTM

## 2024-06-26 NOTE — PLAN OF CARE
"Goal Outcome Evaluation:  Plan of Care Reviewed With: patient        Progress: improving  Outcome Evaluation: Pt tolerated treatment fair this date. Required min-mod A for bed mobility, assist only needed for R LE d/t weakness and pain. Pt stood w/ CGA, then ambulated 35ft w/ Rw and SBA-CGA. Limited overall d/t pain, nausea, and weakness. Pt completed R hip exercises, w/ assist needed for heel slides and hip abd/add. LAURA was flashing green initially during session, though by the end was flashing red/orange and dressing was completely saturated. RN was notified. Discussed d/c plan w/ pt d/t current progress and concern as pt states she lives alone and \"Lanre\" who is helping is unsteady and at falls risk per pt. Recommended pt consider having someone else assist at home especially w/ bed mobility, or consider SNF.      Anticipated Discharge Disposition (PT): home with home health, home with assist, skilled nursing facility                        "

## 2024-06-26 NOTE — PLAN OF CARE
Goal Outcome Evaluation:  Plan of Care Reviewed With: patient        Progress: improving  Outcome Evaluation: VSS, RA when awake, SL, BRP, assist of 1, pain tolerable, nausea once, LAURA in place-green light blinking, home today

## 2024-06-26 NOTE — PROGRESS NOTES
"BP 90/53 (BP Location: Right arm, Patient Position: Lying)   Pulse 67   Temp 98.7 °F (37.1 °C) (Oral)   Resp 16   Ht 170.2 cm (67\")   Wt 91.1 kg (200 lb 13.4 oz)   SpO2 97%   BMI 31.46 kg/m²     Results from last 7 days   Lab Units 06/26/24  0438 06/24/24  1543 06/21/24  0654   WBC 10*3/mm3  --   --  5.33   HEMOGLOBIN g/dL 10.1*   < > 12.2   HEMATOCRIT % 30.7*   < > 37.3   PLATELETS 10*3/mm3  --   --  171    < > = values in this interval not displayed.       Results from last 7 days   Lab Units 06/24/24  1543   SODIUM mmol/L 140   POTASSIUM mmol/L 4.0   CHLORIDE mmol/L 104   CO2 mmol/L 22.2   BUN mg/dL 21   CREATININE mg/dL 0.83   GLUCOSE mg/dL 156*   CALCIUM mg/dL 8.9       Imaging Results (Last 24 Hours)       ** No results found for the last 24 hours. **            Patient Care Team:  Jose Kay MD as PCP - General (Internal Medicine)    SUBJECTIVE  Still c/o significant nausea  PHYSICAL EXAM  LAURA is saturated.     Hip joint replacement status      PLAN / DISPOSITION:  Consult Hospitalist about nausea.    Consider D/C after feeling better from nausea  AL Cervantes  06/26/24  07:07 EDT    "

## 2024-06-27 VITALS
SYSTOLIC BLOOD PRESSURE: 106 MMHG | HEART RATE: 80 BPM | WEIGHT: 200 LBS | BODY MASS INDEX: 31.39 KG/M2 | DIASTOLIC BLOOD PRESSURE: 62 MMHG | HEIGHT: 67 IN | OXYGEN SATURATION: 95 % | TEMPERATURE: 99 F | RESPIRATION RATE: 16 BRPM

## 2024-06-27 PROBLEM — I77.810 AORTIC ROOT DILATATION: Status: ACTIVE | Noted: 2024-06-27

## 2024-06-27 LAB
HCT VFR BLD AUTO: 28.8 % (ref 34–46.6)
HGB BLD-MCNC: 9.5 G/DL (ref 12–15.9)

## 2024-06-27 PROCEDURE — 85014 HEMATOCRIT: CPT | Performed by: ORTHOPAEDIC SURGERY

## 2024-06-27 PROCEDURE — 85018 HEMOGLOBIN: CPT | Performed by: ORTHOPAEDIC SURGERY

## 2024-06-27 PROCEDURE — 63710000001 DOCUSATE SODIUM 100 MG CAPSULE: Performed by: ORTHOPAEDIC SURGERY

## 2024-06-27 PROCEDURE — G0378 HOSPITAL OBSERVATION PER HR: HCPCS

## 2024-06-27 PROCEDURE — 97530 THERAPEUTIC ACTIVITIES: CPT

## 2024-06-27 PROCEDURE — A9270 NON-COVERED ITEM OR SERVICE: HCPCS | Performed by: ORTHOPAEDIC SURGERY

## 2024-06-27 PROCEDURE — 63710000001 FAMOTIDINE 20 MG TABLET: Performed by: ORTHOPAEDIC SURGERY

## 2024-06-27 PROCEDURE — A9270 NON-COVERED ITEM OR SERVICE: HCPCS | Performed by: HOSPITALIST

## 2024-06-27 PROCEDURE — 63710000001 ASPIRIN 81 MG TABLET DELAYED-RELEASE: Performed by: ORTHOPAEDIC SURGERY

## 2024-06-27 PROCEDURE — 25810000003 SODIUM CHLORIDE 0.9 % SOLUTION: Performed by: HOSPITALIST

## 2024-06-27 PROCEDURE — 97110 THERAPEUTIC EXERCISES: CPT

## 2024-06-27 PROCEDURE — 63710000001 HYDROCODONE-ACETAMINOPHEN 5-325 MG TABLET: Performed by: HOSPITALIST

## 2024-06-27 RX ORDER — HYDROCODONE BITARTRATE AND ACETAMINOPHEN 5; 325 MG/1; MG/1
1 TABLET ORAL EVERY 4 HOURS PRN
Qty: 30 TABLET | Refills: 0 | Status: SHIPPED | OUTPATIENT
Start: 2024-06-27

## 2024-06-27 RX ADMIN — SODIUM CHLORIDE 500 ML: 9 INJECTION, SOLUTION INTRAVENOUS at 11:00

## 2024-06-27 RX ADMIN — FAMOTIDINE 40 MG: 20 TABLET, FILM COATED ORAL at 09:12

## 2024-06-27 RX ADMIN — DOCUSATE SODIUM 100 MG: 100 CAPSULE, LIQUID FILLED ORAL at 09:12

## 2024-06-27 RX ADMIN — HYDROCODONE BITARTRATE AND ACETAMINOPHEN 1 TABLET: 5; 325 TABLET ORAL at 09:12

## 2024-06-27 RX ADMIN — ASPIRIN 81 MG: 81 TABLET, COATED ORAL at 09:12

## 2024-06-27 NOTE — PROGRESS NOTES
Name: FREDY Rivera ADMIT: 2024   : 1945  PCP: Jose Kay MD    MRN: [id number] LOS: 0 days   AGE/SEX: 79 y.o. female  ROOM: [facility]     Subjective   Subjective   Generally she is feeling better.  She was able to eat some breakfast.  No further vomiting.  Still gets a little nauseous.  Got dizzy when I stood her up for orthostatics this morning.  Was able to walk out to the hallway with therapy without dizziness or difficulty with balance.     Objective   Objective   Vital Signs  Temp:  [98.8 °F (37.1 °C)-100.6 °F (38.1 °C)] 98.8 °F (37.1 °C)  Heart Rate:  [77-90] 90  Resp:  [16] 16  BP: (104-137)/(56-89) 127/69  SpO2:  [90 %-97 %] 97 %  on  Flow (L/min):  [2] 2;   Device (Oxygen Therapy): room air  Body mass index is 31.32 kg/m².  Physical Exam  Vitals and nursing note reviewed.   Constitutional:       General: She is not in acute distress.  Cardiovascular:      Rate and Rhythm: Normal rate and regular rhythm.   Pulmonary:      Effort: Pulmonary effort is normal.      Breath sounds: Normal breath sounds.   Abdominal:      General: Bowel sounds are normal.      Palpations: Abdomen is soft.      Tenderness: There is no abdominal tenderness.   Musculoskeletal:         General: No swelling.   Skin:     General: Skin is warm and dry.   Neurological:      Mental Status: She is alert. Mental status is at baseline.         Results Review:       I reviewed the patient's new clinical results.  Results from last 7 days   Lab Units 24  0441 24  0438 24  0322 24  1543 24  0654   WBC 10*3/mm3  --   --   --   --  5.33   HEMOGLOBIN g/dL 9.5* 10.1* 10.6* 11.2* 12.2   PLATELETS 10*3/mm3  --   --   --   --  171     Results from last 7 days   Lab Units 24  1250 24  1543 24  0654   SODIUM mmol/L 138 140 145   POTASSIUM mmol/L 4.2 4.0 3.6   CHLORIDE mmol/L 102 104 107   CO2 mmol/L 25.1 22.2 25.2   BUN mg/dL 18 21 22   CREATININE mg/dL 0.74 0.83 0.90  "  GLUCOSE mg/dL 119* 156* 126*   EGFR mL/min/1.73 82.4 71.8 65.2     Results from last 7 days   Lab Units 06/26/24  1250 06/21/24  0654   ALBUMIN g/dL 3.4* 4.2   BILIRUBIN mg/dL 0.5 0.5   ALK PHOS U/L 73 92   AST (SGOT) U/L 27 18   ALT (SGPT) U/L 14 16     Results from last 7 days   Lab Units 06/26/24  1250 06/24/24  1543 06/21/24  0654   CALCIUM mg/dL 8.7 8.9 9.5   ALBUMIN g/dL 3.4*  --  4.2       No results found for: \"HGBA1C\", \"POCGLU\"    I have personally reviewed all medications:  Scheduled Medications  aspirin, 81 mg, Oral, Q12H  famotidine, 40 mg, Oral, Daily  rOPINIRole, 1 mg, Oral, Daily  rOPINIRole, 2 mg, Oral, Nightly  Scopolamine, 1 patch, Transdermal, Q72H  sodium chloride, 500 mL, Intravenous, Once    Infusions   Diet  Diet: Regular/House; Fluid Consistency: Thin (IDDSI 0)    I have personally reviewed:  [x]  Laboratory   [x]  Microbiology   [x]  Radiology   []  EKG/Telemetry  [x]  Cardiology/Vascular   Adult Transthoracic Echo Complete W/ Cont if Necessary Per Protocol  Interpretation Summary    Left ventricular systolic function is normal. Calculated left ventricular EF = 63.1%    Left ventricular diastolic function is consistent with (grade I) impaired relaxation.    Mild tricuspid valve regurgitation is present.    Estimated right ventricular systolic pressure from tricuspid regurgitation is mildly elevated (35-45 mmHg). Calculated right ventricular systolic pressure from tricuspid regurgitation is 37 mmHg.    Mild dilation of the aortic root is present. The aortic root (corrected for BSA) measures 4.3 cm.  []  Pathology    []  Records       Assessment/Plan     Active Hospital Problems    Diagnosis  POA    **Hip joint replacement status [Z96.649]  Not Applicable    Aortic root dilatation [I77.810]  Yes    Postoperative nausea and vomiting [R11.2, Z98.890]  No    Cardiac murmur [R01.1]  Yes      Resolved Hospital Problems   No resolved problems to display.       79 y.o. female who is s/p TOTAL HIP " ARTHROPLASTY ANTERIOR WITH HANA TABLE.    Echocardiogram with normal EF, mild diastolic dysfunction, mild TR and mild dilation of aortic root 4.3 cm.    Mildly orthostatic this morning will give another 500 cc normal saline.  Encouraged her to drink fluids.  It seems her nausea is improving.  No evidence of ileus or obstruction.  No evidence of centrally mediated nausea.  Suspect effects of anesthesia and pain medicine.  Continue Norco instead of the Percocet.  Zofran and/or Phenergan for the nausea.  No objection to discharge.    Discussed with her results of echocardiogram.  Advised that she follow-up with PCP regarding ongoing surveillance of aortic root dilation.       Temo Fishman MD  [facility]  06/27/24  10:16 EDT

## 2024-06-27 NOTE — THERAPY TREATMENT NOTE
Patient Name: FREDY Rivera  : 1945    MRN: [id number]                              Today's Date: 2024       Admit Date: 2024    Visit Dx:     ICD-10-CM ICD-9-CM   1. Status post right hip replacement  Z96.641 V43.64     Patient Active Problem List   Diagnosis    Colon cancer screening    Hip joint replacement status    Postoperative nausea and vomiting    Cardiac murmur    Aortic root dilatation     Past Medical History:   Diagnosis Date    Elevated cholesterol     Restless leg syndrome     Skin cancer      Past Surgical History:   Procedure Laterality Date    BREAST SURGERY      BREAST IMPLANTS    CHOLECYSTECTOMY      COLONOSCOPY N/A 2017    Procedure: COLONOSCOPY;  Surgeon: Gayathri Yates MD;  Location: [facility] ENDOSCOPY;  Service:     COLONOSCOPY N/A 2022    Procedure: COLONOSCOPY;  Surgeon: Gayathri Yates MD;  Location: [facility] OR;  Service: Gastroenterology;  Laterality: N/A;  polyps, diverticulosis    SKIN CANCER EXCISION      TONSILLECTOMY      TOTAL HIP ARTHROPLASTY Right 2024    Procedure: TOTAL HIP ARTHROPLASTY ANTERIOR WITH HANA TABLE;  Surgeon: Sheng Zamora II, MD;  Location[place] [facility] [place] [facility]  Service: Orthopedics;  Laterality: Right;    TUBAL ABDOMINAL LIGATION        General Information       Row Name 24 1054          Physical Therapy Time and Intention    Document Type therapy note (daily note)  -EJ     Mode of Treatment physical therapy  -EJ               User Key  (r) = Recorded By, (t) = Taken By, (c) = Cosigned By      Initials Name Provider Type    EJ Faith Vidales, PT Physical Therapist                   Mobility       Row Name 24 1054          Bed Mobility    Comment, (Bed Mobility) up in chair  -EJ       Row Name 24 1056          Sit-Stand Transfer    Sit-Stand [place] (Transfers) contact guard;standby assist  -EJ     Assistive Device (Sit-Stand Transfers) walker, front-wheeled  -EJ     Comment,  (Sit-Stand Transfer) cues for hand placement  -EJ       Row Name 06/27/24 1054          Gait/Stairs (Locomotion)    [place] Level (Gait) contact guard;standby assist  -EJ     Assistive Device (Gait) walker, front-wheeled  -EJ     Distance in Feet (Gait) 160  -EJ     Deviations/Abnormal Patterns (Gait) antalgic;wilfrid decreased;stride length decreased  -EJ     [place] Level (Stairs) verbal cues;contact guard;minimum assist (75% patient effort)  -EJ     Handrail Location (Stairs) right side (ascending)  -EJ     Number of Steps (Stairs) 3  -EJ     Ascending Technique (Stairs) step-to-step  -EJ     Descending Technique (Stairs) step-to-step  -EJ     Comment, (Gait/Stairs) cues for sequence w stairs, CGA to ascend steps, CGA/min A to descend steps  -EJ               User Key  (r) = Recorded By, (t) = Taken By, (c) = Cosigned By      Initials Name Provider Type    Faith Monroe, PT Physical Therapist                   Obj/Interventions       Row Name 06/27/24 1055          Motor Skills    Therapeutic Exercise --  R Hip protocol w assistance x 5 reps  -EJ               User Key  (r) = Recorded By, (t) = Taken By, (c) = Cosigned By      Initials Name Provider Type    Faith Monroe, PT Physical Therapist                   Goals/Plan    No documentation.                  Clinical Impression       Row Name 06/27/24 1055          Pain    Pretreatment Pain Rating 0/10 - no pain  -EJ       Row Name 06/27/24 1055          Plan of Care Review    Plan of Care Reviewed With patient;spouse  -EJ     Outcome Evaluation Pt seen for PT this am. SHe is up in chair upon entry to room. Possible DC home today. Pt progressing w therapy. She requires encouragement, but doing well. Pt able to stand w SBA/CGA w cues for hand placement and use of Rwx. She was able to ambulate approx 160 ft w SBA/CGA. No unsteadiness noted. SHe negotiated 3 steps w CGA/min A using R handrail. Cues for sequence. Pt back in chair at end of  session. She tolerated hip exercises w assistance. Educated pt on importance of working hard w therapy and following through on her own w exercises and mobility. She verbalizes understanding. Pt safe to DC home w HHPT from PT standpoint. DIscussed w RN as well.  -GABRIELA       Row Name 06/27/24 1055          Positioning and Restraints    Pre-Treatment Position sitting in chair/recliner  -EJ     Post Treatment Position chair  -EJ     In Chair notified nsg;reclined;call light within reach;encouraged to call for assist;exit alarm on;with family/caregiver  -GABRIELA               User Key  (r) = Recorded By, (t) = Taken By, (c) = Cosigned By      Initials Name Provider Type    Faith Monroe, PT Physical Therapist                   Outcome Measures       Row Name 06/27/24 1058 06/27/24 0912       How much help from another person do you currently need...    Turning from your back to your side while in flat bed without using bedrails? 3  -EJ 3  -JM    Moving from lying on back to sitting on the side of a flat bed without bedrails? 3  -EJ 3  -JM    Moving to and from a bed to a chair (including a wheelchair)? 3  -EJ 3  -JM    Standing up from a chair using your arms (e.g., wheelchair, bedside chair)? 3  -EJ 3  -JM    Climbing 3-5 steps with a railing? 3  -EJ 2  -JM    To walk in hospital room? 3  -EJ 3  -JM    AM-PAC 6 Clicks Score (PT) 18  -EJ 17  -JM    Highest Level of Mobility Goal 6 --> Walk 10 steps or more  -EJ 5 --> Static standing  -JM              User Key  (r) = Recorded By, (t) = Taken By, (c) = Cosigned By      Initials Name Provider Type    Faith Monroe, PT Physical Therapist    Kristy Saucedo, RN Registered Nurse                                 Physical Therapy Education       Title: PT OT SLP Therapies (Done)       Topic: Physical Therapy (Done)       Point: Mobility training (Done)       Learning Progress Summary             Patient Acceptance, E,TB,D, VU,NR by  at 6/26/2024 1010     Acceptance, E,TB, VU by MS at 6/25/2024 1312                         Point: Home exercise program (Done)       Learning Progress Summary             Patient Acceptance, E,TB,D, VU,NR by  at 6/26/2024 1010    Acceptance, E,TB, VU by MS at 6/25/2024 1312                         Point: Body mechanics (Done)       Learning Progress Summary             Patient Acceptance, E,TB,D, VU,NR by  at 6/26/2024 1010    Acceptance, E,TB, VU by MS at 6/25/2024 1312                         Point: Precautions (Done)       Learning Progress Summary             Patient Acceptance, E,TB,D, VU,NR by  at 6/26/2024 1010    Acceptance, E,TB, VU by MS at 6/25/2024 1312                                         User Key       Initials Effective Dates Name Provider Type Discipline     03/07/18 -  Ludy Parsons, PTA Physical Therapist Assistant PT    MS 06/16/21 -  Sofy Chavez PT Physical Therapist PT                  PT Recommendation and Plan     Plan of Care Reviewed With: patient, spouse  Outcome Evaluation: Pt seen for PT this am. SHe is up in chair upon entry to room. Possible DC home today. Pt progressing w therapy. She requires encouragement, but doing well. Pt able to stand w SBA/CGA w cues for hand placement and use of Rwx. She was able to ambulate approx 160 ft w SBA/CGA. No unsteadiness noted. SHe negotiated 3 steps w CGA/min A using R handrail. Cues for sequence. Pt back in chair at end of session. She tolerated hip exercises w assistance. Educated pt on importance of working hard w therapy and following through on her own w exercises and mobility. She verbalizes understanding. Pt safe to DC home w HHPT from PT standpoint. DIscussed w RN as well.     Time Calculation:         PT Charges       Row Name 06/27/24 1059             Time Calculation    Start Time 1023  -EJ      Stop Time 1049  -EJ      Time Calculation (min) 26 min  -EJ      PT Received On 06/27/24  -EJ      PT - Next Appointment 06/28/24  -EJ          Timed Charges    77628 - PT Therapeutic Exercise Minutes 10  -EJ      31761 - PT Therapeutic Activity Minutes 16  -EJ         Total Minutes    Timed Charges Total Minutes 26  -EJ       Total Minutes 26  -EJ                User Key  (r) = Recorded By, (t) = Taken By, (c) = Cosigned By      Initials Name Provider Type    Faith Monroe, PT Physical Therapist                  Therapy Charges for Today       Code Description Service Date Service Provider Modifiers Qty    20629663610 HC PT THERAPEUTIC ACT EA 15 MIN 6/27/2024 Faith Vidales, PT GP 1    96890565663 HC PT THER PROC EA 15 MIN 6/27/2024 Faith Vidales, PT GP 1            PT G-Codes  Outcome Measure Options: AM-PAC 6 Clicks Basic Mobility (PT)  AM-PAC 6 Clicks Score (PT): 18  PT Discharge Summary  Anticipated Discharge Disposition (PT): home with assist, home with home health    Faith Vidales, PT  6/27/2024

## 2024-06-27 NOTE — PROGRESS NOTES
Continued Stay Note  [facility]     Patient Name: FREDY Rivera  MRN: [id number]  Today's Date: 6/27/2024    Admit Date: 6/24/2024    Plan: Home with family support & Cordell QUIÑONEZ.   Discharge Plan       Row Name 06/27/24 1636       Plan    Final Discharge Disposition Code 06 - home with home health care    [facility]                   Discharge Codes    No documentation.                 Expected Discharge Date and Time       Expected Discharge Date Expected Discharge Time    Jun 26, 2024               Cate Garcia RN

## 2024-06-27 NOTE — DISCHARGE SUMMARY
Orthopaedic Discharge Summary  Dr. REINOSO “Freddy” Sera BARNARD  [phone]    NAME: FREDY Rivera PCP: Jose Kay MD   :  MRN: [id number]  [id number] LOS:  ADMIT: 0 days  2024   AGE/SEX: 79 y.o. female DC:  today             Admitting Diagnosis: Hip joint replacement status [Z96.649]    Surgery Performed: MN ARTHRP ACETBLR/PROX FEM PROSTC AGRFT/ALGRFT [81233] (TOTAL HIP ARTHROPLASTY ANTERIOR WITH HANA TABLE)    Discharge Medications:         Discharge Medications        New Medications        Instructions Start Date   aspirin 81 MG EC tablet   81 mg, Oral, Every 12 Hours      ondansetron 4 MG tablet  Commonly known as: Zofran   4 mg, Oral, Every 6 Hours PRN             Changes to Medications        Instructions Start Date   HYDROcodone-acetaminophen 5-325 MG per tablet  Commonly known as: NORCO  What changed:   when to take this  reasons to take this  additional instructions   1 tablet, Oral, Every 4 Hours PRN             Continue These Medications        Instructions Start Date   Claritin 10 MG tablet  Generic drug: loratadine   10 mg, Oral, Daily      ibuprofen 600 MG tablet  Commonly known as: ADVIL,MOTRIN   HOLD FOR SURGERY PER MD INSTRUCTIONS      rOPINIRole 1 MG tablet  Commonly known as: REQUIP  Notes to patient: 24 PM   TAKE ONE TABLET BY MOUTH  PM, THEN TAKE TWO TABLETS BY MOUTH AT BEDTIME      rosuvastatin 40 MG tablet  Commonly known as: CRESTOR  Notes to patient: 24   1 tablet, Oral, Daily               Vitals:     Vitals:    24 0608 24 0609 24 0610 24 1010   BP: 127/56 104/82 127/69 106/62   BP Location: Right arm Right arm Right arm Left arm   Patient Position: Standing Sitting Lying Sitting   Pulse: 90   80   Resp: 16   16   Temp: 98.8 °F (37.1 °C)   99 °F (37.2 °C)   TempSrc: Oral   Oral   SpO2: 97%   95%   Weight:       Height:           Labs:      Admission on 2024   Component Date Value Ref Range Status    Glucose 2024 156 (H)   65 - 99 mg/dL Final    BUN 06/24/2024 21  8 - 23 mg/dL Final    Creatinine 06/24/2024 0.83  0.57 - 1.00 mg/dL Final    Sodium 06/24/2024 140  136 - 145 mmol/L Final    Potassium 06/24/2024 4.0  3.5 - 5.2 mmol/L Final    Chloride 06/24/2024 104  98 - 107 mmol/L Final    CO2 06/24/2024 22.2  22.0 - 29.0 mmol/L Final    Calcium 06/24/2024 8.9  8.6 - 10.5 mg/dL Final    BUN/Creatinine Ratio 06/24/2024 25.3 (H)  7.0 - 25.0 Final    Anion Gap 06/24/2024 13.8  5.0 - 15.0 mmol/L Final    eGFR 06/24/2024 71.8  >60.0 mL/min/1.73 Final    Hemoglobin 06/24/2024 11.2 (L)  12.0 - 15.9 g/dL Final    Hematocrit 06/24/2024 34.4  34.0 - 46.6 % Final    Hemoglobin 06/25/2024 10.6 (L)  12.0 - 15.9 g/dL Final    Hematocrit 06/25/2024 31.9 (L)  34.0 - 46.6 % Final    Hemoglobin 06/26/2024 10.1 (L)  12.0 - 15.9 g/dL Final    Hematocrit 06/26/2024 30.7 (L)  34.0 - 46.6 % Final    Glucose 06/26/2024 119 (H)  65 - 99 mg/dL Final    BUN 06/26/2024 18  8 - 23 mg/dL Final    Creatinine 06/26/2024 0.74  0.57 - 1.00 mg/dL Final    Sodium 06/26/2024 138  136 - 145 mmol/L Final    Potassium 06/26/2024 4.2  3.5 - 5.2 mmol/L Final    Chloride 06/26/2024 102  98 - 107 mmol/L Final    CO2 06/26/2024 25.1  22.0 - 29.0 mmol/L Final    Calcium 06/26/2024 8.7  8.6 - 10.5 mg/dL Final    Total Protein 06/26/2024 6.1  6.0 - 8.5 g/dL Final    Albumin 06/26/2024 3.4 (L)  3.5 - 5.2 g/dL Final    ALT (SGPT) 06/26/2024 14  1 - 33 U/L Final    AST (SGOT) 06/26/2024 27  1 - 32 U/L Final    Alkaline Phosphatase 06/26/2024 73  39 - 117 U/L Final    Total Bilirubin 06/26/2024 0.5  0.0 - 1.2 mg/dL Final    Globulin 06/26/2024 2.7  gm/dL Final    A/G Ratio 06/26/2024 1.3  g/dL Final    BUN/Creatinine Ratio 06/26/2024 24.3  7.0 - 25.0 Final    Anion Gap 06/26/2024 10.9  5.0 - 15.0 mmol/L Final    eGFR 06/26/2024 82.4  >60.0 mL/min/1.73 Final    EF(MOD-bp) 06/26/2024 63.1  % Final    LVIDd 06/26/2024 4.9  cm Final    LVIDs 06/26/2024 3.2  cm Final    IVSd 06/26/2024 0.94   cm Final    LVPWd 06/26/2024 1.13  cm Final    FS 06/26/2024 35.5  % Final    IVS/LVPW 06/26/2024 0.83  cm Final    ESV(cubed) 06/26/2024 32.2  ml Final    LV Sys Vol (BSA corrected) 06/26/2024 10.9  cm2 Final    EDV(cubed) 06/26/2024 120.1  ml Final    LV Smith Vol (BSA corrected) 06/26/2024 27.7  cm2 Final    LV mass(C)d 06/26/2024 185.8  grams Final    LVOT area 06/26/2024 3.2  cm2 Final    LVOT diam 06/26/2024 2.02  cm Final    EDV(MOD-sp2) 06/26/2024 62.0  ml Final    EDV(MOD-sp4) 06/26/2024 56.0  ml Final    ESV(MOD-sp2) 06/26/2024 22.0  ml Final    ESV(MOD-sp4) 06/26/2024 22.0  ml Final    SV(MOD-sp2) 06/26/2024 40.0  ml Final    SV(MOD-sp4) 06/26/2024 34.0  ml Final    SVi(MOD-SP2) 06/26/2024 19.8  ml/m2 Final    SVi(MOD-SP4) 06/26/2024 16.8  ml/m2 Final    SVi (LVOT) 06/26/2024 53.3  ml/m2 Final    EF(MOD-sp2) 06/26/2024 64.5  % Final    EF(MOD-sp4) 06/26/2024 60.7  % Final    MV E max antwan 06/26/2024 97.5  cm/sec Final    MV A max antwan 06/26/2024 145.9  cm/sec Final    MV dec time 06/26/2024 0.25  sec Final    MV E/A 06/26/2024 0.67   Final    Pulm A Revs Dur 06/26/2024 0.10  sec Final    MV A dur 06/26/2024 0.09  sec Final    LA ESV Index (BP) 06/26/2024 17.4  ml/m2 Final    Med Peak E' Antwan 06/26/2024 8.6  cm/sec Final    Lat Peak E' Antwan 06/26/2024 7.6  cm/sec Final    TR max antwan 06/26/2024 293.2  cm/sec Final    Avg E/e' ratio 06/26/2024 12.04   Final    SV(LVOT) 06/26/2024 107.7  ml Final    RV Base 06/26/2024 3.0  cm Final    TAPSE (>1.6) 06/26/2024 2.08  cm Final    RV S' 06/26/2024 13.1  cm/sec Final    Pulm Sys Antwan 06/26/2024 51.9  cm/sec Final    Pulm Smith Antwan 06/26/2024 38.8  cm/sec Final    Pulm S/D 06/26/2024 1.34   Final    Pulm A Revs Antwan 06/26/2024 40.4  cm/sec Final    LV V1 max 06/26/2024 169.0  cm/sec Final    LV V1 max PG 06/26/2024 11.4  mmHg Final    LV V1 mean PG 06/26/2024 6.0  mmHg Final    LV V1 VTI 06/26/2024 33.5  cm Final    Ao pk antwan 06/26/2024 221.6  cm/sec Final    Ao max PG  06/26/2024 19.7  mmHg Final    Ao mean PG 06/26/2024 9.0  mmHg Final    Ao V2 VTI 06/26/2024 43.1  cm Final    LIZA(I,D) 06/26/2024 2.50  cm2 Final    AI P1/2t 06/26/2024 461.0  msec Final    MV max PG 06/26/2024 9.5  mmHg Final    MV mean PG 06/26/2024 2.5  mmHg Final    MV V2 VTI 06/26/2024 26.7  cm Final    MV P1/2t 06/26/2024 78.9  msec Final    MVA(P1/2t) 06/26/2024 2.8  cm2 Final    MVA(VTI) 06/26/2024 4.0  cm2 Final    MV dec slope 06/26/2024 370.9  cm/sec2 Final    MR max radha 06/26/2024 356.2  cm/sec Final    MR max PG 06/26/2024 50.7  mmHg Final    TR max PG 06/26/2024 34.4  mmHg Final    RVSP(TR) 06/26/2024 37.4  mmHg Final    RAP systole 06/26/2024 3.0  mmHg Final    Ao root diam 06/26/2024 3.9  cm Final    Dimensionless Index 06/26/2024 0.90  (DI) Final    Ao root area (BSA corrected) 06/26/2024 4.3  cm2 Final    Hemoglobin 06/27/2024 9.5 (L)  12.0 - 15.9 g/dL Final    Hematocrit 06/27/2024 28.8 (L)  34.0 - 46.6 % Final        No results found.    Hospital Course:   79 y.o. female was admitted to [facility] to services of Sheng Zamora II, MD with Hip joint replacement status [Z96.649] on 6/24/2024 and underwent MN ARTHRP ACETBLR/PROX FEM PROSTC AGRFT/ALGRFT [62945] (TOTAL HIP ARTHROPLASTY ANTERIOR WITH HANA TABLE). Post-operatively the patient transferred to the floor where the patient underwent mobilization therapy. Opioids were titrated to achieve appropriate pain management to allow for participation in mobilization exercises. Vital signs and laboratory values are now within safe parameters for discharge. The dressings and/or incision is intact without signs or symptoms of active infection. Operative extremity neurovascular status remains intact as compared to the preoperative exam. Appropriate education re: incision care, activity levels, medications, and follow up visits was completed and all questions were answered. The patient is now deemed stable for discharge.    HOME: The  "patient progressed well with physical therapy. There were cleared for discharge to home. The patietn was sent home in good condition}.       R \"Freddy\" Sera BARNARD MD  Orthopaedic Surgery  [facility]  [phone]                                               "

## 2024-06-27 NOTE — PLAN OF CARE
Goal Outcome Evaluation:  Plan of Care Reviewed With: patient        Progress: improving  Outcome Evaluation: VSS, RA, SL, up with assist of 1, voiding per BRP, no BM this shift but passing gas, no complaints of nausea or dizzyness, encouraged IS use, home on DC when stable

## 2024-06-27 NOTE — PLAN OF CARE
Goal Outcome Evaluation:  Plan of Care Reviewed With: patient, spouse           Outcome Evaluation: Pt seen for PT this am. SHe is up in chair upon entry to room. Possible DC home today. Pt progressing w therapy. She requires encouragement, but doing well. Pt able to stand w SBA/CGA w cues for hand placement and use of Rwx. She was able to ambulate approx 160 ft w SBA/CGA. No unsteadiness noted. SHe negotiated 3 steps w CGA/min A using R handrail. Cues for sequence. Pt back in chair at end of session. She tolerated hip exercises w assistance. Educated pt on importance of working hard w therapy and following through on her own w exercises and mobility. She verbalizes understanding. Pt safe to DC home w HHPT from PT standpoint. DIscussed w RN as well.      Anticipated Discharge Disposition (PT): home with assist, home with home health

## 2024-06-27 NOTE — PROGRESS NOTES
"/69 (BP Location: Right arm, Patient Position: Lying)   Pulse 90   Temp 98.8 °F (37.1 °C) (Oral)   Resp 16   Ht 170.2 cm (67\")   Wt 90.7 kg (200 lb)   SpO2 97%   BMI 31.32 kg/m²     Results from last 7 days   Lab Units 06/27/24  0441 06/24/24  1543 06/21/24  0654   WBC 10*3/mm3  --   --  5.33   HEMOGLOBIN g/dL 9.5*   < > 12.2   HEMATOCRIT % 28.8*   < > 37.3   PLATELETS 10*3/mm3  --   --  171    < > = values in this interval not displayed.       Results from last 7 days   Lab Units 06/26/24  1250   SODIUM mmol/L 138   POTASSIUM mmol/L 4.2   CHLORIDE mmol/L 102   CO2 mmol/L 25.1   BUN mg/dL 18   CREATININE mg/dL 0.74   GLUCOSE mg/dL 119*   CALCIUM mg/dL 8.7       Imaging Results (Last 24 Hours)       Procedure Component Value Units Date/Time    XR Abdomen KUB [[id number]] Collected: 06/26/24 0904     Updated: 06/26/24 0908    Narrative:      XR ABDOMEN KUB-     Clinical: Postoperative nausea     FINDINGS: Nonobstructive bowel gas pattern. Hemostatic clips right upper  quadrant from previous cholecystectomy. The soft tissue planes are  preserved. No atypical calcification seen.     CONCLUSION: Nonspecific abdomen.     This report was finalized on 6/26/2024 9:05 AM by Dr. Good Ward M.D  on Workstation: [id number]               Patient Care Team:  Jose Kay MD as PCP - General (Internal Medicine)    SUBJECTIVE  Still not feeling well.  PHYSICAL EXAM  LAURA intact     Hip joint replacement status    Postoperative nausea and vomiting    Cardiac murmur      PLAN / DISPOSITION:  Going to reach out to [facility] to see if eligible for rehab.    If not eligible, then D/C with AL Hathaway  06/27/24  08:58 EDT    "

## 2024-07-01 ENCOUNTER — TELEPHONE (OUTPATIENT)
Dept: ORTHOPEDIC SURGERY | Facility: HOSPITAL | Age: 79
End: 2024-07-01
Payer: MEDICARE

## 2024-07-01 NOTE — TELEPHONE ENCOUNTER
Called and spoke with Ms. Rivera to see how she is doing as she is 1 week SP ANTONI. She said she is doing ok. PT came out to see her on Friday and she is getting up and doing the exercises. Dressing remains in place with green light blinking. Pain is controlled with the pain medication. She was finally able to have a BM over the weekend and seems to be getting back to normal now. There was some confusion on her PT and said she hasn't heard anything since that initial visit. I called [facility] spoke with Elizabeth and confirmed they are coming to see her today and Friday. Dressing to be removed Wednesday. Someone from  to be giving her a call. Called and spoke with Patients caregiver to relay message. Ms. Rivera doesn't have any other questions for me at this time. She has my contact information should she need anything.

## 2024-07-16 ENCOUNTER — TRANSCRIBE ORDERS (OUTPATIENT)
Dept: PHYSICAL THERAPY | Facility: CLINIC | Age: 79
End: 2024-07-16
Payer: MEDICARE

## 2024-07-16 DIAGNOSIS — M25.551 RIGHT HIP PAIN: Primary | ICD-10-CM

## 2024-07-16 DIAGNOSIS — M54.50 LOW BACK PAIN, UNSPECIFIED BACK PAIN LATERALITY, UNSPECIFIED CHRONICITY, UNSPECIFIED WHETHER SCIATICA PRESENT: ICD-10-CM

## 2024-07-23 ENCOUNTER — TREATMENT (OUTPATIENT)
Dept: PHYSICAL THERAPY | Facility: CLINIC | Age: 79
End: 2024-07-23
Payer: MEDICARE

## 2024-07-23 DIAGNOSIS — R26.9 ABNORMAL GAIT: ICD-10-CM

## 2024-07-23 DIAGNOSIS — Z96.641 STATUS POST RIGHT HIP REPLACEMENT: ICD-10-CM

## 2024-07-23 DIAGNOSIS — R29.898 WEAKNESS OF RIGHT HIP: ICD-10-CM

## 2024-07-23 DIAGNOSIS — M25.551 RIGHT HIP PAIN: Primary | ICD-10-CM

## 2024-07-23 DIAGNOSIS — Z74.09 IMPAIRED FUNCTIONAL MOBILITY, BALANCE, GAIT, AND ENDURANCE: ICD-10-CM

## 2024-07-23 DIAGNOSIS — M54.50 LUMBAR PAIN: ICD-10-CM

## 2024-07-23 NOTE — PROGRESS NOTES
[facility]    Physical Therapy Initial Evaluation and Plan of Care    Patient Name: FREDY Rivera          :  1945  Referring Physician: Sheng Zamora II, MD  Diagnosis: Right hip pain [M25.551] ; Status post right hip replacement [Z96.641]   Date of Evaluation: 2024  ______________________________________________________________________    Subjective Evaluation    History of Present Illness  Mechanism of injury: (R) ANTONI Ant approach; 2024  Going to [facility] health care - 2024 - progressively worsening (R) leg /hip pain - Went to [facility] - Xray then MRI - avascular necrosis - (R) ANTONI 24/  SINCE (R) HIP ANTONI:   Not doing well - sig pain -   Was feeling good up to 3 weeks post op - having home health - PT had her do increased exercises - have had progressively worsening pain -   PT  - Standing Hip abduction;  then Had her lay down supine and do more hip abduction and  SLR FF -  Noted progressively worsening pain since then has had significantly increased pain during that time and after -   Pain in (R) buttock - given steroid pack - no help      PMHX: LBP in past, but nothing serious ; RLS -     Pain  Current pain ratin  At worst pain rating: 10  Location: (B) BUTTOCK and anterior Thigh - Can't sleep at night -  Quality: Sharp burning pain ;  Alleviating factors: Pain meds, (caused constipation) -  Exacerbated by: WB-ing RLE; AROM (R) Hip / LE - Walking, stairs - Rising, sitting down; Stairs.  Progression: worsening (Since last Home Health appt (2024) -)    Social Support  Patient lives at: Home w/ stairs, but bedroom down stairs - Walk-out basement - has a pool -    Diagnostic Tests  X-ray: normal (Per patient -)    Treatments  Current treatment: home therapy and medication  Patient Goals  Patient/family treatment goals: Pain alleviation; Normal ADLs LEs - walk stairs reciprocally.       ___________________________________________________  Objective           Observations     Additional Hip Observation Details  Pt presents ambulating w/ RW w/ decreased WB-in RLE - guarded and painful transfers - needs help w/ RLE with sit<->supine transfers and very painful bed mobility w/ RLE due to pain -     Tenderness     Additional Tenderness Details  Severely tender (R) Piriformis / gluteal region and Glute med insertion at GT (R)    Active Range of Motion   Left Hip   Normal active range of motion    Right Hip   Flexion: 90 degrees with pain  Extension: 20 degrees   External rotation (90/90): 20 degrees with pain  Internal rotation (90/90): 35 degrees with pain    Additional Active Range of Motion Details        Strength/Myotome Testing     Left Hip   Normal muscle strength    Right Hip   Planes of Motion   Flexion: 3- (Severe pain)  Abduction: 2+ (supine - could not move actively into abd due to pain in supine)  Adduction: 3 (painful)  External rotation: 3- (Seated and painful)  Internal rotation: 3- (Seated and painful)    Additional Strength Details  (R) KNEE FLEX 4/5;   KNEE EXT 4+/5    Tests     Additional Tests Details  NA due to severe pain and recent ANTONI (R) -       TREATMENT:   Exercises  - Hip Flexion  - 1 x daily - 7 x weekly - 2-3 sets - 10-15 reps - 3-5 hold  - SHORT ARC QUAD  - 1-2 x daily - 7 x weekly - 1 sets - 20 reps - 10 sec hold  - Seated Long Arc Quad  - 1-2 x daily - 7 x weekly - 1 sets - 10-15 reps - 3-5 hold  - Seated March  - 1-2 x daily - 7 x weekly - 1 sets - 10-15 reps  - Seated Heel Toe Raises  - 1 x daily - 7 x weekly - 1 sets - 10-15 reps - 3 s hold  - Seated Hip Adduction Isometrics with Ball  - 1 x daily - 7 x weekly - 1-2 sets - 15-20 reps - 3-5 sec hold  - Seated Hip Abduction  - 1 x daily - 7 x weekly - 1-2 sets - 10-15 reps - 3sec hold  - Seated Gluteal Sets  - 2-3 x daily - 7 x weekly - 1 sets - 10-15 reps - 5 sec hold      FUNCTIONAL ACTIVITIES:   TAPING / BRACING: NA  Anatomy / dysfunction education   Jt protection, ADL modification;  Posture and     ___________________________________________________  Assessment & Plan       Assessment  Impairments: abnormal gait, abnormal muscle firing, abnormal or restricted ROM, activity intolerance, impaired balance, impaired physical strength, lacks appropriate home exercise program, pain with function and weight-bearing intolerance   Functional limitations: sleeping, walking, pulling, pushing, uncomfortable because of pain, moving in bed, sitting, standing and unable to perform repetitive tasks   Assessment details: 80 yo female: S/P (R) ANTONI (ant approach) 6/24/2024 -   Doing well at 3 weeks post-op, but severely increased buttock / hip pain following aggressive Home Health PT session.    Pt may benefit from addition of Aquatic therapy as tolerated      GOALS:   SHORT TERM GOALS: 4-6 weeks:    1) HEP Initiated for flexibility, strengthening, gait training, functional activity, balance/proprioception programs to meet LTG -    2) Pain decreased 50% at rest and w/ ROM / ADLs (R) Hip / LE :   3) AROM  increased (R) hip flexion > 100-deg;: to allow improved gait, bed transfers, etc.   4) Pt able to actively ABDuct (R) hip in supine - to allow improved transfers, bed mobility, etc.   5) Pt able to ambulate w/ st cane w/ normal gait w/ pain decreased 50%;     LONG TERM GOALS: 8-12 weeks (or at time of DISCHARGE):   1) (I) HEP;   2) AROM WFL and pain free to allow improved gait, stair climbing reciprocally w/ HR prn, normal car/bed transfers, etc. ;   3) Strength / endurance to be able to perform all ADL's w/ AD prn -   4) Pt demonstrates normal gait w/o pain and able to climb / descend stairs reciprocally w/ HR prn.   5) Functional Test LEFS score improved > 50/80 and OSWESTRY reduced to <40%      Prognosis: good    Plan  Therapy options: will be seen for skilled therapy services  Planned therapy interventions: abdominal trunk stabilization, flexibility, functional ROM exercises, gait training, home  exercise program, manual therapy, neuromuscular re-education, postural training, soft tissue mobilization, strengthening, stretching and therapeutic activities (Modalities prn; Taping/bracing prn)  Frequency: 2x week  Duration in weeks: 12  Plan details: Pt may benefit from Aquatic Therapy in the near future - schedule as appropriate -       ___________________________________________________  Manual Therapy:         mins  95697;  Therapeutic Exercise:    10     mins  98017;     Neuromuscular Alisha:        mins  64156;    Therapeutic Activity:     15     mins  58113;   Self Care:                           mins  89371  Ultrasound:          mins  70873;  Iontophoresis:          mins  65607;    Electrical Stimulation:         mins  97032 ( );  Mechanical Traction:          mins  01271  Dry Needling          mins self-pay    Eval:   20   mins    Timed Treatment:   25   mins                  Total Treatment:     45   mins    PT SIGNATURE:     Carlyle Finn, PT  DATE TREATMENT INITIATED: 7/23/2024  ___________________________________________________  Initial Certification  Certification Period: 10/21/2024  I certify that the therapy services are furnished while this patient is under my care.  The services outlined above are required by this patient, and will be reviewed every 90 days.     PHYSICIAN: ________________________________  DATE: ______  Sheng Zamora II, MD        Please sign and return via fax to [phone].. Thank you, [facility].  ______________________________________________________________________  [address] [place] [address]  Phone: [phone] Fax: [phone]    Access Code: [email or web address]  URL: [email or web address]  Date: 07/23/2024  Prepared by: Cristopher Finn    Exercises  - Hip Flexion  - 1 x daily - 7 x weekly - 2-3 sets - 10-15 reps - 3-5 hold  - SHORT ARC QUAD  - 1-2 x daily - 7 x weekly - 1 sets - 20 reps - 10 sec hold  - Seated  Long Arc Quad  - 1-2 x daily - 7 x weekly - 1 sets - 10-15 reps - 3-5 hold  - Seated March  - 1-2 x daily - 7 x weekly - 1 sets - 10-15 reps  - Seated Heel Toe Raises  - 1 x daily - 7 x weekly - 1 sets - 10-15 reps - 3 s hold  - Seated Hip Adduction Isometrics with Ball  - 1 x daily - 7 x weekly - 1-2 sets - 15-20 reps - 3-5 sec hold  - Seated Hip Abduction  - 1 x daily - 7 x weekly - 1-2 sets - 10-15 reps - 3sec hold  - Seated Gluteal Sets  - 2-3 x daily - 7 x weekly - 1 sets - 10-15 reps - 5 sec hold

## 2024-07-25 ENCOUNTER — TREATMENT (OUTPATIENT)
Dept: PHYSICAL THERAPY | Facility: CLINIC | Age: 79
End: 2024-07-25
Payer: MEDICARE

## 2024-07-25 DIAGNOSIS — M25.551 RIGHT HIP PAIN: Primary | ICD-10-CM

## 2024-07-25 NOTE — PROGRESS NOTES
Physical Therapy Daily Note    Patient Name: FREDY Rivera         :  1945  Referring Physician: Sheng Zamora II, MD  Treatment Date: 2024  Date of Initial Visit: Type: THERAPY  Noted: 2024    Visit Diagnoses:    ICD-10-CM ICD-9-CM   1. Right hip pain  M25.551 719.45     Patient seen for 2 sessions  _____________________________________________________    Subjective   FREDY Rivera reports: woke up feeling better - like she could put more wt on it today - Able to walk and not really needing the walker - no longer having buttock pain - Now only in anterior hip -     Objective   Pt ambulating w/ RW - much improved WB-ing RLE -   Improved transfers and bed mobility -    TREATMENT:   Exercises  [x]   - HIP EXT STRETCH; on bed (gradually bed off side of bed) x 5 min  [x]   - SUPINE HEEL SLIDES 10/5 sec ea  [x]   - SUPINE Gluteal Sets  -15 reps - 5 sec hold  (B) / R-L  [x]   - SHORT ARC QUAD  - 20 reps - 10 sec hold  [x]   - Seated Long Arc Quad  15 reps - 3 sechold  [x]   - Seated March  - 1-2 x daily - 7 x weekly - 1 sets - 10-15 reps  [x]   - STANDING Heel Toe Raises  15  hold  [x]   - HOOKLYING HIP AB/ADD w/ green band and ball between knees - 20 reps -5 sec hold  [x]   - SIT/STAND off side of bed 24in from floor - x 15    ADD:   []   NUSTEP  []   STEP UP  []   SIDE STEP  []   MONSTER WALK  []   SLS  -      NMR: Verbal and tactile cues to facilitate glutes, hip, quad, LE musculature statically and dynamically. - Balance / proprioception activities -  -      Functional / Therapeutic Activities:    TAPING / BRACING: NA  SEE EXERCISE FLOW SHEET -   Fitted Pt w/ st cane for LUE -   Worked w/ Pt on proper gait sequence w/ st cane in LUE -   Pt practiced ambulating w/ st cane  Jt protection, ADL modification; Posture and      Assessment/Plan  78 yo female: S/P (R) ANTONI (ant approach) 2024   Significantly decreased pain in glutes, now discomfort is anterior hip / scar  region    Progress strengthening /stabilization /functional activity       _________________________________________________    Manual Therapy:                 mins  91522;  Therapeutic Exercise:    15    mins  49863;     Neuromuscular Alisha:   07     mins  22806;    Therapeutic Activity:     22      mins  12574;     Ultrasound:                          mins  19080;  Iontophoresis:                     mins  77712;    Electrical Stimulation:         mins  91162 ( );  Mechanical Traction:          mins  62925  Dry Needling                       mins self-pay     Timed Treatment:   44    mins                  Total Treatment:     44    mins        Carlyle Finn PT  Physical Therapist  Lic # [id number]

## 2024-08-01 ENCOUNTER — TREATMENT (OUTPATIENT)
Dept: PHYSICAL THERAPY | Facility: CLINIC | Age: 79
End: 2024-08-01
Payer: MEDICARE

## 2024-08-01 DIAGNOSIS — Z74.09 IMPAIRED FUNCTIONAL MOBILITY, BALANCE, GAIT, AND ENDURANCE: ICD-10-CM

## 2024-08-01 DIAGNOSIS — M25.551 RIGHT HIP PAIN: Primary | ICD-10-CM

## 2024-08-01 DIAGNOSIS — Z96.641 STATUS POST RIGHT HIP REPLACEMENT: ICD-10-CM

## 2024-08-01 DIAGNOSIS — R29.898 WEAKNESS OF RIGHT HIP: ICD-10-CM

## 2024-08-01 DIAGNOSIS — R26.9 ABNORMAL GAIT: ICD-10-CM
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
## 2024-08-14 ENCOUNTER — TREATMENT (OUTPATIENT)
Dept: PHYSICAL THERAPY | Facility: CLINIC | Age: 79
End: 2024-08-14
Payer: MEDICARE

## 2024-08-14 DIAGNOSIS — Z96.641 STATUS POST RIGHT HIP REPLACEMENT: ICD-10-CM

## 2024-08-14 DIAGNOSIS — R26.9 ABNORMAL GAIT: ICD-10-CM

## 2024-08-14 DIAGNOSIS — M25.551 RIGHT HIP PAIN: Primary | ICD-10-CM

## 2024-08-14 DIAGNOSIS — Z74.09 IMPAIRED FUNCTIONAL MOBILITY, BALANCE, GAIT, AND ENDURANCE: ICD-10-CM

## 2024-08-14 DIAGNOSIS — R29.898 WEAKNESS OF RIGHT HIP: ICD-10-CM

## 2024-08-14 NOTE — PROGRESS NOTES
Physical Therapy Daily Note    Patient Name: FREDY Rivera         :  1945  Referring Physician: Sheng Zamora II, MD  Treatment Date: 2024  Date of Initial Visit: Type: THERAPY  Noted: 2024    Visit Diagnoses:    ICD-10-CM ICD-9-CM   1. Right hip pain  M25.551 719.45   2. Status post right hip replacement  Z96.641 V43.64   3. Abnormal gait  R26.9 781.2   4. Weakness of right hip  R29.898 729.89   5. Impaired functional mobility, balance, gait, and endurance  Z74.09 V49.89     Patient seen for 4 sessions  _____________________________________________________    Subjective   FREDY Rivera reports: saw Kimberly Beth in Dr. Torre office and she pushed on her inner proximal thigh and had severe pain - and it felt all balled up   Took Xrays and ANTONI looked good - confirmed by Dr. Zamora -   Pt reports her pain is not in her inner thigh, but in her groin and posterior hip/buttock region -   Given a steroid pack which felt good while she was taking it, but now pain has returned -     Pain w/ hip flexion, rolling side/side in bed,and sleeping on side -   Most pain w/ WB-ing RLE - and standing up to 30 min-   Minimal / no pain at rest -      Objective   Pt ambulating w/ RW -   HIP FLEXION > 100-deg; w/o pain   Very tight / tender anterior hip / scar, hip flexors and painful to extend her hip passively, even in supine - Limited HL Hip ER w/ feet on ground and painful -     TREATMENT:   MANUAL THERAPY:   STM/ DTM Anterior hip, hip flexors, proximal quads (R)    EXERCISE  [x]   - HIP EXT STRETCH; supine on bed (gradually bed off side of bed) x 5 min  [x]   - SUPINE HEEL SLIDES 10/5 sec ea  [x]   - SUPINE SINGLE KNEE TO CHEST (R) 8x15 sec ea  [x]   - SUPINE HIP ABDuction w/ PT assist  []   - SHORT ARC QUAD  - 20 reps - 10 sec hold  []   - Seated Long Arc Quad  15 reps - 3 sechold  [x]   - STANDING March  15 reps  [x]   - STANDING Heel Toe Raises  10  in walker WBAT   [x]   - HOOKLYING HIP ABD w/ no  resistance and ball between knees for ADDuction- 20 reps -5 sec hold  [x]   - SIT/STAND off side of bed 24in from floor - x 15  [x]   NUSTEP seat 9  Arms 10 x 10 min  L5   EDUCATED Pt ON SIDESTEPPING at COUNTER and MARCHING, SQUATS, etc    ADD:   []   STEP UP  []   SIDE STEP  []   MONSTER WALK  []   SLS  -      NMR: Verbal and tactile cues to facilitate glutes, hip, quad, LE musculature statically and dynamically. - Balance / proprioception activities -  -      Functional / Therapeutic Activities:    TAPING / BRACING: NA  SEE EXERCISE FLOW SHEET -   Education of Pt on importance of not sitting too long and doing more hip extension and stretching / DTM to anterior hip to facilitate hip extension to decrease pain and improved gait / functional ability -       Assessment/Plan  78 yo female: S/P (R) ANTONI (ant approach) 6/24/2024   Was much improved after severe increase in pain after Home Health PT, but fell trying to get into her car at grocery and had increased groin / ant hip pain and loss of mobility and function since -     Persistent pain anterior, post/lat hip pain (R) and limited tolerance to extension and AROM -   Improved active hip flexion and less tender/tight anterior hip after MT -   Pt would benefit from self massage and more stretching into extension including sitting less -    Progress strengthening /stabilization /functional activity       _________________________________________________    Manual Therapy:            10     mins  20101;  Therapeutic Exercise:    20    mins  12023;     Neuromuscular Alisha:   02     mins  08061;    Therapeutic Activity:     13      mins  03188;     Ultrasound:                          mins  32048;  Iontophoresis:                     mins  44606;    Electrical Stimulation:         mins  89574 ( );  Mechanical Traction:          mins  74820  Dry Needling                       mins self-pay     Timed Treatment:   45    mins                  Total Treatment:     45     mins        Carlyle Finn, PT  Physical Therapist  Lic # [id number]

## 2024-08-21 ENCOUNTER — TREATMENT (OUTPATIENT)
Dept: PHYSICAL THERAPY | Facility: CLINIC | Age: 79
End: 2024-08-21
Payer: MEDICARE

## 2024-08-21 DIAGNOSIS — R29.898 WEAKNESS OF RIGHT HIP: ICD-10-CM

## 2024-08-21 DIAGNOSIS — M25.551 RIGHT HIP PAIN: Primary | ICD-10-CM

## 2024-08-21 DIAGNOSIS — Z96.641 STATUS POST RIGHT HIP REPLACEMENT: ICD-10-CM

## 2024-08-21 DIAGNOSIS — R26.9 ABNORMAL GAIT: ICD-10-CM

## 2024-08-21 DIAGNOSIS — Z74.09 IMPAIRED FUNCTIONAL MOBILITY, BALANCE, GAIT, AND ENDURANCE: ICD-10-CM

## 2024-08-21 PROCEDURE — 97530 THERAPEUTIC ACTIVITIES: CPT | Performed by: PHYSICAL THERAPIST

## 2024-08-21 PROCEDURE — 97112 NEUROMUSCULAR REEDUCATION: CPT | Performed by: PHYSICAL THERAPIST

## 2024-08-21 PROCEDURE — 97110 THERAPEUTIC EXERCISES: CPT | Performed by: PHYSICAL THERAPIST

## 2024-08-21 NOTE — PROGRESS NOTES
Physical Therapy Daily Note    Patient Name: FREDY Rivera         :  1945  Referring Physician: Sheng Zamora II, MD  Treatment Date: 2024  Date of Initial Visit: Type: THERAPY  Noted: 2024    Visit Diagnoses:    ICD-10-CM ICD-9-CM   1. Right hip pain  M25.551 719.45   2. Status post right hip replacement  Z96.641 V43.64   3. Abnormal gait  R26.9 781.2   4. Weakness of right hip  R29.898 729.89   5. Impaired functional mobility, balance, gait, and endurance  Z74.09 V49.89     Patient seen for 5 sessions  _____________________________________________________    Subjective   FREDY Rivera reports: persistent pain anterior hip - Sig pain when turning in bed - feels like a knife stabbing her -   Granddaughter getting  - formal wear  - [place] - very tired and painful after -   Pain w/ walking  -     Objective   Pt ambulating w/ RW - antalgic gait RLE - painful transfers sit<->supine;   Very difficulty to full extend (R) hip - due to pain   Very tight and tender anterior hip, scar, HF, Ant thigh / Quads / Hfs (R) w/ painful TPs -       TREATMENT:   MANUAL THERAPY:   STM/ DTM / TPR Anterior hip, hip flexors, proximal quads (R)     EXERCISE  [x]   - HIP EXT STRETCH in supine w/ knee ext w/ HP on anteior hip x 5 min;   [x]   - HIP EXT STRETCH OFF SIDE of BED w/ PT LE SUPPORT 3x 1 min ea  [x]   - SUPINE HEEL SLIDES 10/5 sec ea  [x]   - SUPINE SINGLE KNEE TO CHEST (R) 8x15 sec ea  [x]   - SUPINE HIP ABDuction w/ PT assist  [x]   - HOOKLYING HIP ABD w/ no resistance and ball between knees for ADDuction- 20 reps -5 sec hold  []   - SHORT ARC QUAD  - 20 reps - 10 sec hold  []   - Seated Long Arc Quad  15 reps - 3 sechold  [x]   - STANDING  HIP EXTENSION STRETCH 5/30 sec eaMarch  15 reps  [x]   - STANDING Heel Toe Raises  10  in walker WBAT   [x]   - SIT/STAND off side of bed 24in from floor - x 15  []   - SIDESTEPPING  []   - MONSTER WALK Fwd /  Retro  [x]   NUSTEP seat 9  Arms 10 x 10 min  L5   EDUCATED Pt ON SIDESTEPPING at COUNTER and MARCHING, SQUATS, etc     ADD:   []   STEP UP  []   SIDE STEP  []   MONSTER WALK  []   SLS  -      NMR: Verbal and tactile cues to facilitate glutes, hip, quad, LE musculature statically and dynamically. - Balance / proprioception activities -  -         Functional / Therapeutic Activities:    TAPING / BRACING: NA  SEE EXERCISE FLOW SHEET -   Explained to Pt the importance of getting out of sitting position to facilitate hip extension - due to sig hip flexion tightness - and self massage and importance of same -   Jt protection, ADL modification; Posture and      Assessment/Plan  80 yo female: S/P (R) ANTONI (ant approach) 6/24/2024   Was much improved after severe increase in pain after Home Health PT, but fell trying to get into her car at grocery and had increased groin / ant hip pain and loss of mobility and function since -      Persistent pain anterior, post/lat hip pain (R) and limited tolerance to extension and AROM -   Improved active hip flexion and less tender/tight anterior hip after MT -   Pt would benefit from self massage and more stretching into extension including sitting less -    Progress strengthening /stabilization /functional activity       _________________________________________________    Manual Therapy:            05     mins  97945;  Therapeutic Exercise:    20    mins  29775;     Neuromuscular Alisha:   05     mins  65002;    Therapeutic Activity:     15      mins  92731;     Ultrasound:                          mins  51061;  Iontophoresis:                     mins  69339;    Electrical Stimulation:         mins  41648 ( );  Mechanical Traction:          mins  73312  Dry Needling                       mins self-pay     Timed Treatment:   45    mins                  Total Treatment:     45    mins        Carlyle Finn, PT  Physical Therapist  Lic # [id number]

## 2024-08-29 ENCOUNTER — TREATMENT (OUTPATIENT)
Dept: PHYSICAL THERAPY | Facility: CLINIC | Age: 79
End: 2024-08-29
Payer: MEDICARE

## 2024-08-29 DIAGNOSIS — R26.9 ABNORMAL GAIT: ICD-10-CM

## 2024-08-29 DIAGNOSIS — R29.898 WEAKNESS OF RIGHT HIP: ICD-10-CM

## 2024-08-29 DIAGNOSIS — Z74.09 IMPAIRED FUNCTIONAL MOBILITY, BALANCE, GAIT, AND ENDURANCE: ICD-10-CM

## 2024-08-29 DIAGNOSIS — M25.551 RIGHT HIP PAIN: Primary | ICD-10-CM

## 2024-08-29 DIAGNOSIS — Z96.641 STATUS POST RIGHT HIP REPLACEMENT: ICD-10-CM

## 2024-08-29 NOTE — PROGRESS NOTES
Physical Therapy Daily Note    Patient Name: FREDY Rivera         :  1945  Referring Physician: Sheng Zamora II, MD  Treatment Date: 2024  Date of Initial Visit: Type: THERAPY  Noted: 2024    Visit Diagnoses:    ICD-10-CM ICD-9-CM   1. Right hip pain  M25.551 719.45   2. Status post right hip replacement  Z96.641 V43.64   3. Abnormal gait  R26.9 781.2   4. Weakness of right hip  R29.898 729.89   5. Impaired functional mobility, balance, gait, and endurance  Z74.09 V49.89     Patient seen for 6 sessions  _____________________________________________________    Subjective   FREDY Rivera reports: (R) hip doing better - (B) knee pain and giving way - (R) knee been giving way more since the surgery -   Buttock pain when sitting (R) > 15-20 min in the last couple of days -   Now able to lift knee      Objective   Pt ambulating w/ RW -   Tender anterior hip, scar, quad/HF (R)   Improved overall tolerance to HF stretch off side of bed  Pt unable to perform SL CLAM nor HIP ABDuction -     TREATMENT:   MANUAL THERAPY:   STM/ DTM Anterior hip, hip flexors, proximal quads (R) w/ knee/hip ext on bed  STM/ DTM Anterior hip, hip flexors, proximal quads (R) w/ HF stretch off side of bed -     EXERCISE  [x]   - HIP EXT STRETCH in supine w/ knee ext w/ HP on anteior hip x 5 min;   [x]   - HIP EXT STRETCH OFF SIDE of BED w/ SUPPORT 3x 1 min ea  [x]   - SUPINE HEEL SLIDES 10/5 sec ea  [x]   - SUPINE SINGLE KNEE TO CHEST (R) 4x30 sec ea  [x]   - SUPINE (R) HIP BUTTERFLY STRETCH 6/30 sec ea (pain in buttock)  []   - SUPINE HIP ABDuction w/ PT assist  [x]   - BRIDGES 10/5 sec ea  BUTTERFLY POSITION to prevent HS cramping  [x]   - HOOKLYING HIP ABD w/ no resistance (GREEN) and ball between knees for ADDuction- 20 reps -5 sec hold  []   - SHORT ARC QUAD  - 20 reps - 10 sec hold  []   - Seated Long Arc Quad  15 reps - 3 sechold  [x]   - STANDING  HIP EXTENSION STRETCH 3/30 sec eaMarch  15 reps  []   -  STANDING Heel Toe Raises  10  in walker WBAT   [x]   - SIT/STAND off side of bed 24in from floor - x 15  [x]   - SIDESTEPPING - along therapy bed x 5 each   []   - MONSTER WALK Fwd / Retro  [x]   NUSTEP seat 9  Arms 10 x 10 min  L5        ADD:   []   STEP UP  []   SIDE STEP  []   MONSTER WALK  []   SLS  []   KATARZYNA LEG PRESS  []   KATARZYNA HIP ABDuction  []   OCTANE  -      NMR: Verbal and tactile cues to facilitate glutes, hip, quad, LE musculature statically and dynamically. - Balance / proprioception activities -  -      Functional / Therapeutic Activities:    TAPING / BRACING: NA  SEE EXERCISEs      Assessment/Plan  78 yo female: S/P (R) ANTONI (ant approach) 6/24/2024   Was much improved after severe increase in pain after Home Health PT, but fell trying to get into her car at grocery and had increased groin / ant hip pain and loss of mobility and function since -      Persistent pain anterior, post/lat hip pain (R) and limited tolerance to extension and AROM -   Improved active hip flexion and less tender/tight anterior hip after MT -   Pt would benefit from self massage and more stretching into extension including sitting less -    Progress strengthening /stabilization /functional activity       _________________________________________________    Manual Therapy:            12     mins  48225;  Therapeutic Exercise:    25    mins  56133;     Neuromuscular Alisha:   06     mins  63994;    Therapeutic Activity:     10      mins  10486;     Ultrasound:                          mins  63627;  Iontophoresis:                     mins  78065;    Electrical Stimulation:         mins  67561 ( );  Mechanical Traction:          mins  55432  Dry Needling                       mins self-pay     Timed Treatment:   53    mins                  Total Treatment:     53    mins        Carlyle Finn, PT  Physical Therapist  Lic # [id number]

## 2024-09-04 ENCOUNTER — TREATMENT (OUTPATIENT)
Dept: PHYSICAL THERAPY | Facility: CLINIC | Age: 79
End: 2024-09-04
Payer: MEDICARE

## 2024-09-04 DIAGNOSIS — Z96.641 STATUS POST RIGHT HIP REPLACEMENT: ICD-10-CM

## 2024-09-04 DIAGNOSIS — R26.9 ABNORMAL GAIT: ICD-10-CM

## 2024-09-04 DIAGNOSIS — M25.551 RIGHT HIP PAIN: Primary | ICD-10-CM

## 2024-09-04 DIAGNOSIS — Z74.09 IMPAIRED FUNCTIONAL MOBILITY, BALANCE, GAIT, AND ENDURANCE: ICD-10-CM

## 2024-09-04 DIAGNOSIS — R29.898 WEAKNESS OF RIGHT HIP: ICD-10-CM

## 2024-09-04 NOTE — PROGRESS NOTES
Physical Therapy Daily Note    Patient Name: FREDY Rivera         :  1945  Referring Physician: Sheng Zamora II, MD  Treatment Date: 2024  Date of Initial Visit: Type: THERAPY  Noted: 2024    Visit Diagnoses:    ICD-10-CM ICD-9-CM   1. Right hip pain  M25.551 719.45   2. Abnormal gait  R26.9 781.2   3. Status post right hip replacement  Z96.641 V43.64   4. Weakness of right hip  R29.898 729.89   5. Impaired functional mobility, balance, gait, and endurance  Z74.09 V49.89     Patient seen for 7 sessions  _____________________________________________________    Subjective   FREDY Rivera reports: saw APRN - yesterday - told to walk - Unable to lift her leg very far - Pain at night in hip - MD came in (Dr. Zamora) - looked at Xrays - Concerned at ball at top of hip (per Patient) -   Tips of fingers numb since her fall - MD said lumbar 4-5 are close together or may be from her neck -   Ordered a MRI of hip and spine - for next Saturday - wants her to see another MD within the practice (Dr. Vigil?) -     Objective   Pt ambulating w/ RW - improved pace and stride length   Antalgic gait w/ decreased WB-ing RLE w/ Posterior hip / buttock region -     TREATMENT:   MANUAL THERAPY:   STM/ DTM Anterior hip, hip flexors, proximal quads (R) w/ knee/hip ext on bed  STM/ DTM Anterior hip, hip flexors, proximal quads (R) w/ HF stretch off side of bed -     EXERCISE  [x]   - HIP EXT STRETCH in supine w/ knee ext w/ HP on anteior hip x 5 min;   [x]   - HIP EXT STRETCH OFF SIDE of BED w/ SUPPORT 3x 1 min ea  [x]   - SUPINE HEEL SLIDES 10/5 sec ea  [x]   - SUPINE SINGLE KNEE TO CHEST (R) 4x30 sec ea  [x]   - SUPINE (R) HIP BUTTERFLY STRETCH 6/30 sec ea (pain in buttock)  []   - SUPINE HIP ABDuction w/ PT assist  [x]   - BRIDGES 10/5 sec ea  BUTTERFLY POSITION to prevent HS cramping  [x]   - HOOKLYING HIP ABD w/ no resistance (GREEN) and ball between knees for ADDuction- 20 reps -5 sec hold  []   - SHORT  ARC QUAD  - 20 reps - 10 sec hold  []   - Seated Long Arc Quad  15 reps - 3 sechold  []   - STANDING  HIP EXTENSION STRETCH 3/30 sec eaMarch  15 reps  [x]   - STANDING Heel Toe Raises  10  in walker WBAT   [x]   - SIT/STAND off side of bed 24in from floor - x 15  [x]   - SIDESTEPPING - along therapy bed x 5 each   [x]   NUSTEP seat 9  Arms 10 x 10 min  L7        ADD:   []  MONSTER WALK Fwd / Retro  []   STEP UP  []   SIDE STEP  []   MONSTER WALK  []   SLS  []   KATARZYNA LEG PRESS  []   KATARZYNA HIP ABDuction  []   OCTANE  -      NMR: Verbal and tactile cues to facilitate glutes, hip, quad, LE musculature statically and dynamically. - Balance / proprioception activities -  -      Functional / Therapeutic Activities:    TAPING / BRACING: NA  SEE EXERCISEs  Discussed at length options for causes for pain - MD plan coming up and how we should hold PT until after Test results and after seeing Dr. Zamora after MRI -     Assessment/Plan  78 yo female: S/P (R) ANTONI (ant approach) 6/24/2024   Was much improved after severe increase in pain after Home Health PT, but fell trying to get into her car at grocery and had increased groin / ant hip pain and loss of mobility and function since -      Persistent pain anterior, post/lat hip pain (R) and limited tolerance to extension and AROM -   Improved active hip flexion and less tender/tight anterior hip after MT -   Pt would benefit from self massage and more stretching into extension including sitting less -    Prosthetic issue from fall in parking lot ? -         Progress strengthening /stabilization /functional activity- To have MRI a week from this Saturday for hip and spine-   Hold PT after next session until after further testing to rule out / hip prosthesis and / or spine involvement -      _________________________________________________    Manual Therapy:            10     mins  67710;  Therapeutic Exercise:    20    mins  98843;     Neuromuscular Alisha:   05     mins  67236;     Therapeutic Activity:     10      mins  37463;     Ultrasound:                          mins  91533;  Iontophoresis:                     mins  71757;    Electrical Stimulation:         mins  65052 ( );  Mechanical Traction:          mins  92038  Dry Needling                       mins self-pay     Timed Treatment:   45    mins                  Total Treatment:     45    mins        Carlyle Finn PT  Physical Therapist  Lic # [id number]

## 2024-11-25 ENCOUNTER — TRANSCRIBE ORDERS (OUTPATIENT)
Dept: ADMINISTRATIVE | Facility: HOSPITAL | Age: 79
End: 2024-11-25
Payer: MEDICARE

## 2024-11-25 DIAGNOSIS — G57.21 LESION OF RIGHT FEMORAL NERVE: Primary | ICD-10-CM

## 2024-12-20 ENCOUNTER — HOSPITAL ENCOUNTER (OUTPATIENT)
Dept: CT IMAGING | Facility: HOSPITAL | Age: 79
Discharge: HOME | End: 2024-12-20
Payer: MEDICARE

## 2024-12-20 DIAGNOSIS — G57.21: ICD-10-CM

## 2024-12-20 DIAGNOSIS — G57.21 LESION OF RIGHT FEMORAL NERVE: ICD-10-CM

## 2024-12-20 PROCEDURE — 73701 CT LOWER EXTREMITY W/DYE: CPT

## 2024-12-20 PROCEDURE — 72193 CT PELVIS W/DYE: CPT

## 2024-12-20 PROCEDURE — 25510000001 IOPAMIDOL 61 % SOLUTION: Performed by: PSYCHIATRY & NEUROLOGY

## 2024-12-20 RX ORDER — IOPAMIDOL 612 MG/ML
100 INJECTION, SOLUTION INTRAVASCULAR
Status: COMPLETED | OUTPATIENT
Start: 2024-12-20 | End: 2024-12-20

## 2024-12-20 RX ADMIN — IOPAMIDOL 85 ML: 612 INJECTION, SOLUTION INTRAVENOUS at 09:56

## 2025-06-16 ENCOUNTER — OFFICE VISIT (OUTPATIENT)
Dept: ORTHOPEDIC SURGERY | Facility: CLINIC | Age: 80
End: 2025-06-16
Payer: MEDICARE

## 2025-06-16 VITALS — BODY MASS INDEX: 31.39 KG/M2 | WEIGHT: 200 LBS | TEMPERATURE: 98.3 F | HEIGHT: 67 IN

## 2025-06-16 DIAGNOSIS — M25.561 PAIN IN BOTH KNEES, UNSPECIFIED CHRONICITY: Primary | ICD-10-CM

## 2025-06-16 DIAGNOSIS — M25.562 LEFT KNEE PAIN, UNSPECIFIED CHRONICITY: ICD-10-CM

## 2025-06-16 DIAGNOSIS — M17.10 ARTHRITIS OF KNEE: ICD-10-CM

## 2025-06-16 DIAGNOSIS — M25.562 PAIN IN BOTH KNEES, UNSPECIFIED CHRONICITY: Primary | ICD-10-CM

## 2025-06-16 RX ORDER — METHYLPREDNISOLONE ACETATE 80 MG/ML
80 INJECTION, SUSPENSION INTRA-ARTICULAR; INTRALESIONAL; INTRAMUSCULAR; SOFT TISSUE
Status: COMPLETED | OUTPATIENT
Start: 2025-06-16 | End: 2025-06-16

## 2025-06-16 RX ORDER — LIDOCAINE HYDROCHLORIDE 10 MG/ML
2 INJECTION, SOLUTION EPIDURAL; INFILTRATION; INTRACAUDAL; PERINEURAL
Status: COMPLETED | OUTPATIENT
Start: 2025-06-16 | End: 2025-06-16

## 2025-06-16 RX ADMIN — METHYLPREDNISOLONE ACETATE 80 MG: 80 INJECTION, SUSPENSION INTRA-ARTICULAR; INTRALESIONAL; INTRAMUSCULAR; SOFT TISSUE at 11:43

## 2025-06-16 RX ADMIN — LIDOCAINE HYDROCHLORIDE 2 ML: 10 INJECTION, SOLUTION EPIDURAL; INFILTRATION; INTRACAUDAL; PERINEURAL at 11:43

## 2025-06-16 NOTE — PROGRESS NOTES
Patient: FREDY Rivera    YOB: 1945    Medical Record Number: [id number]    Chief Complaints:  Left knee pain    History of Present Illness:     80 y.o. female patient who presents for evaluation of left knee pain. She reports that the symptoms first began about a year ago, shortly after she had her right hip replaced.  Current knee pain is described as moderate, constant and aching.  The pain is predominantly along the medial side of the knee.  Denies any clicking, popping or catching.  Symptoms are worse with activity.  Symptoms are somewhat better with rest and anti-inflammatories.  Denies any shooting pain down the legs, weakness, numbness or paresthesias.    Allergies: No Known Allergies    Current Outpatient Medications:     ibuprofen (ADVIL,MOTRIN) 600 MG tablet, HOLD FOR SURGERY PER MD INSTRUCTIONS, Disp: , Rfl:     loratadine (Claritin) 10 MG tablet, Take 1 tablet by mouth Daily., Disp: , Rfl:     ondansetron (Zofran) 4 MG tablet, Take 1 tablet by mouth Every 6 (Six) Hours As Needed for Nausea or Vomiting., Disp: 30 tablet, Rfl: 0    rOPINIRole (REQUIP) 1 MG tablet, TAKE ONE TABLET BY MOUTH  PM, THEN TAKE TWO TABLETS BY MOUTH AT BEDTIME, Disp: , Rfl:     rosuvastatin (CRESTOR) 40 MG tablet, Take 1 tablet by mouth Daily., Disp: , Rfl:     Past Medical History:   Diagnosis Date    Elevated cholesterol     Restless leg syndrome     Skin cancer        Past Surgical History:   Procedure Laterality Date    BREAST SURGERY      BREAST IMPLANTS    CHOLECYSTECTOMY      COLONOSCOPY N/A 04/28/2017    Procedure: COLONOSCOPY;  Surgeon: Gayathri Yates MD;  [facility]OSCOPY;  Service:     COLONOSCOPY N/A 04/25/2022    Procedure: COLONOSCOPY;  Surgeon: Gayathri Yates MD;  [place] [facility] [place]  Service: Gastroenterology;  Laterality: N/A;  polyps, diverticulosis    SKIN CANCER EXCISION      TONSILLECTOMY      TOTAL HIP ARTHROPLASTY Right 6/24/2024    Procedure: TOTAL HIP  "ARTHROPLASTY ANTERIOR WITH HANA TABLE;  Surgeon: Sheng Zamora II, MD;  [place] [facility] [place]  Service: Orthopedics;  Laterality: Right;    TUBAL ABDOMINAL LIGATION         Social History     Occupational History    Not on file   Tobacco Use    Smoking status: Never     Passive exposure: Never    Smokeless tobacco: Never   Vaping Use    Vaping status: Never Used   Substance and Sexual Activity    Alcohol use: No    Drug use: No    Sexual activity: Defer      Social History     Social History Narrative    Not on file       Family History   Problem Relation Age of Onset    Colon cancer Mother     Malig Hyperthermia Neg Hx        Review of Systems:      Constitutional: Denies fever, shaking or chills   Eyes: Denies change in visual acuity   HEENT: Denies nasal congestion or sore throat   Respiratory: Denies cough or shortness of breath   Cardiovascular: Denies chest pain or edema  Endocrine: Denies tremors, palpitations, intolerance of heat or cold, polyuria, polydipsia.  GI: Denies abdominal pain, nausea, vomiting, bloody stools or diarrhea  : Denies frequency, urgency, incontinence, retention, or nocturia.  Musculoskeletal: Denies numbness, tingling or loss of motor function except as above  Integument: Denies rash, lesion or ulceration   Neurologic: Denies headache or focal weakness, deficits  Heme: Denies spontaneous or excessive bleeding, epistaxis, hematuria, melena, fatigue, enlarged or tender lymph nodes.      All other pertinent positives and negatives as noted above in HPI.    Physical Exam:   80 y.o. female  Vitals:    06/16/25 1115   Temp: 98.3 °F (36.8 °C)   Weight: 90.7 kg (200 lb)   Height: 170.2 cm (67\")     General:  Patient is awake and alert.  Appears in no acute distress or discomfort.    Psych:  Affect and demeanor are appropriate.    Cardiovascular:  Regular rate and rhythm.    Lungs:  Good chest expansion.  Breathing unlabored.    Spine:  Back appears grossly normal.  No palpable " masses or adenopathy.  Good motion.  Straight leg raise and crossed straight leg raise maneuver are both negative for lower leg and/or knee pain.    Extremities:  Left knee is examined.  Skin is benign.  No obvious gross abnormalities.  No palpable masses or adenopathy.  Moderate tenderness noted over medial joint line.  Motion:  5-120.  No instability.  Strength is well preserved including hip flexion, knee extension, ankle and toe plantarflexion, ankle inversion and eversion.  Good sensory function throughout the leg and foot.  Palpable pulses.  Brisk capillary refill.  Good skin turgor.         Radiology:   Bilateral standing AP views, bilateral merchants views and a lateral view of the left knee are ordered by myself and reviewed to evaluate her complaint.  No comparison films are immediately available.  The x-rays show significant medial compartment degenerative arthritis including joint space narrowing, osteophyte formation, and subchondral sclerosis.     Assessment/Plan:  Left knee osteoarthritis    We discussed treatment options in detail including the risks, benefits, and alternatives of conservative treatment versus surgical options.  Regarding conservative treatment, we discussed appropriate activity modifications, anti-inflammatories, injections (including both corticosteroids and visco supplementation), and physical therapy.  We also discussed the option of an arthroplasty and all that would entail.  I have recommended that we start with a conservative approach and the patient agrees.    She acknowledged understanding of the information.  She was apprehensive but did wish to proceed with the injection.  The risk, benefits and alternatives were discussed.  She consented and the injection was performed as described below.  Going forward, she we will follow-up as needed.      Large Joint Arthrocentesis: L knee  Date/Time: 6/16/2025 11:43 AM  Consent given by: patient  Site marked: site marked  Timeout:  Immediately prior to procedure a time out was called to verify the correct patient, procedure, equipment, support staff and site/side marked as required   Supporting Documentation  Indications: pain   Procedure Details  Location: knee - L knee  Preparation: Patient was prepped and draped in the usual sterile fashion  Needle size: 22 G (21G)  Approach: anterolateral  Medications administered: 80 mg methylPREDNISolone acetate 80 MG/ML; 2 mL lidocaine PF 1% 1 %  Patient tolerance: patient tolerated the procedure well with no immediate complications       Les Barnes MD    06/16/2025    CC to Jose Kay MD

## 2025-07-01 ENCOUNTER — OFFICE VISIT (OUTPATIENT)
Dept: NEUROLOGY | Facility: CLINIC | Age: 80
End: 2025-07-01
Payer: MEDICARE

## 2025-07-01 VITALS
OXYGEN SATURATION: 95 % | DIASTOLIC BLOOD PRESSURE: 74 MMHG | SYSTOLIC BLOOD PRESSURE: 126 MMHG | WEIGHT: 187.4 LBS | HEART RATE: 66 BPM | HEIGHT: 67 IN | BODY MASS INDEX: 29.41 KG/M2

## 2025-07-01 DIAGNOSIS — E78.5 HYPERLIPIDEMIA, UNSPECIFIED HYPERLIPIDEMIA TYPE: ICD-10-CM

## 2025-07-01 DIAGNOSIS — D50.8 OTHER IRON DEFICIENCY ANEMIA: ICD-10-CM

## 2025-07-01 DIAGNOSIS — G60.9 IDIOPATHIC PERIPHERAL NEUROPATHY: Primary | ICD-10-CM

## 2025-07-01 DIAGNOSIS — G25.81 RESTLESS LEGS SYNDROME (RLS): ICD-10-CM

## 2025-07-01 DIAGNOSIS — R73.9 HYPERGLYCEMIA, UNSPECIFIED: ICD-10-CM

## 2025-07-01 PROBLEM — M51.369 DEGENERATION OF LUMBAR INTERVERTEBRAL DISC: Status: ACTIVE | Noted: 2024-10-02

## 2025-07-01 PROBLEM — M16.11 OSTEOARTHRITIS OF RIGHT HIP: Status: ACTIVE | Noted: 2024-06-18

## 2025-07-01 PROBLEM — R20.2 PARESTHESIA: Status: ACTIVE | Noted: 2024-10-02

## 2025-07-01 PROBLEM — G89.18 POSTOPERATIVE PAIN: Status: ACTIVE | Noted: 2025-01-28

## 2025-07-01 PROBLEM — G56.03 BILATERAL CARPAL TUNNEL SYNDROME: Status: ACTIVE | Noted: 2024-10-30

## 2025-07-01 PROBLEM — M79.661 PAIN OF RIGHT LOWER LEG: Status: ACTIVE | Noted: 2024-06-03

## 2025-07-01 PROBLEM — M54.50 LOW BACK PAIN: Status: ACTIVE | Noted: 2024-10-02

## 2025-07-01 PROBLEM — G62.9 POLYNEUROPATHY: Status: ACTIVE | Noted: 2024-10-30

## 2025-07-01 PROBLEM — M54.2 NECK PAIN: Status: ACTIVE | Noted: 2024-09-04

## 2025-07-01 RX ORDER — CARBIDOPA AND LEVODOPA 50; 200 MG/1; MG/1
TABLET, EXTENDED RELEASE ORAL
Qty: 30 TABLET | Refills: 3 | Status: SHIPPED | OUTPATIENT
Start: 2025-07-01

## 2025-07-01 NOTE — PROGRESS NOTES
CC: Right femoral neuropathy    HPI:  FREDY Rivera is a  80 y.o. right-handed white female who was sent for neurological consultation by Dr. Jose Bettencourt who has just retired from the practice of neurology.  She has seen Dr. Jose Kay at [facility] as a primary physician.  The patient had previously been seen by Dr. Kevyn Wasserman in the [facility] system most recently in 2016 for restless leg syndrome.  She was being treated with Mirapex up to at least 0.5 mg 3 times daily.  The patient states it caused her to be excessively drowsy and she stopped it.  She has also been tried on gabapentin and apparently Requip.  These all cause excessive sedation.  She has to take it in the late afternoon as well as at night because it starts in the late afternoon.  She has not been on Sinemet.    The patient had a right total hip replacement in 6/2024.  About 3 weeks afterwards she still had postoperative pain she fell onto her knees and shins and has had persistent right hip and thigh pain.  MRI of the right hip demonstrated no issues.  She was to go to physical therapy again but did not go because she did not feel she would benefit since she cannot raise her leg up at the hip.  She was sent for a neuro consult with Dr. Jose Bettencourt who did an EMG 10/18/2024 and 11/20/2024 involving the lower extremities.  The general study was initially done showing no response to the superficial peroneal sensory nerves and peroneal motor studies with amplitude of 1.7 mV on the left and 3.5 mV on the right with normal conduction velocities below the knees.  The tibial motor studies showed prolonged latencies bilaterally with no responses at the knees (?  Technical).  The amplitudes were normal.  Needle examination of standard muscles in the legs were normal.  The second study was performed very specifically showing 2+ fibrillations and positive sharp waves in the right iliopsoas with reduced recruitment.  Other muscles studied including  "paraspinals were negative.  She was diagnosed with peripheral neuropathy and a right femoral neuropathy.    The patient states that she does have some pain in the right groin hip and leg with weightbearing and some pain in the left knee with weightbearing.  She states when she first gets up she feels \"frozen\".  After walking a short distance her legs seem to limber up.  She complains that both legs feel heavy.  She has some numbness in the feet.  She denies history of smoking or alcohol use.  She has history of anemia of iron deficiency.  She has not been having low back pain until just in the last couple of days she has had some back pain.  She describes some cramping in the feet.  She states the left knee has been described as bone-on-bone.    She states a history of hyperlipidemia but has been off of her rosuvastatin for about 2 months.        Past Medical History:   Diagnosis Date    Elevated cholesterol     Restless leg syndrome     Skin cancer          Past Surgical History:   Procedure Laterality Date    BREAST SURGERY      BREAST IMPLANTS    CHOLECYSTECTOMY      COLONOSCOPY N/A 04/28/2017    Procedure: COLONOSCOPY;  Surgeon: Gayathri Yates MD;  Location: [facility] ENDOSCOPY;  Service:     COLONOSCOPY N/A 04/25/2022    Procedure: COLONOSCOPY;  Surgeon: Gayathri Yates MD;  [facility]: [facility] OR;  Service: Gastroenterology;  Laterality: N/A;  polyps, diverticulosis    SKIN CANCER EXCISION      TONSILLECTOMY      TOTAL HIP ARTHROPLASTY Right 6/24/2024    Procedure: TOTAL HIP ARTHROPLASTY ANTERIOR WITH HANA TABLE;  Surgeon: Sheng Zamora II, MD;  [place] [facility] [place] [facility] [place]  Service: [place]opedics;  Laterality: Right;    TUBAL ABDOMINAL LIGATION             Current Outpatient Medications:     loratadine (Claritin) 10 MG tablet, Take 1 tablet by mouth Daily., Disp: , Rfl:     ondansetron (Zofran) 4 MG tablet, Take 1 tablet by mouth Every 6 (Six) Hours As Needed for Nausea or Vomiting., Disp: 30 " "tablet, Rfl: 0    VITAMIN D, CHOLECALCIFEROL, PO, Take  by mouth., Disp: , Rfl:     carbidopa-levodopa CR (SINEMET CR)  MG per CR tablet, 1/2 tab 1 hour prior to bedtime for 1 week then 1 tab 1 hour prior to bedtime, Disp: 30 tablet, Rfl: 3    rosuvastatin (CRESTOR) 40 MG tablet, Take 1 tablet by mouth Daily. (Patient not taking: Reported on 7/1/2025), Disp: , Rfl:       Family History   Problem Relation Age of Onset    Colon cancer Mother     Malemelia Hyperthermia Neg Hx          Social History     Socioeconomic History    Marital status:    Tobacco Use    Smoking status: Never     Passive exposure: Never    Smokeless tobacco: Never   Vaping Use    Vaping status: Never Used   Substance and Sexual Activity    Alcohol use: No    Drug use: No    Sexual activity: Defer         No Known Allergies      Pain Scale: 0/10 while seated        ROS:  Review of Systems   Musculoskeletal:  Positive for gait problem.   Neurological:  Negative for dizziness, tremors, seizures, syncope, facial asymmetry, speech difficulty, weakness, light-headedness, numbness and headaches.   Psychiatric/Behavioral:  Positive for sleep disturbance. Negative for confusion.          I have reviewed and agree with the above ROS completed by the medical assistant.      Physical Exam:  Vitals:    07/01/25 1437   BP: 126/74   Pulse: 66   SpO2: 95%   Weight: 85 kg (187 lb 6.4 oz)   Height: 170.2 cm (67.01\")     Orthostatic BP:    Body mass index is 29.34 kg/m².    Physical Exam  General: Overweight white female no acute distress  HEENT: Normocephalic no evidence of trauma  Neck: Supple.  No thyromegaly.  No cervical bruits  Heart: Regular rate and rhythm no murmurs.  No pedal edema  Extremities: Radial pulses strong and simultaneous.  Figure 4 sign positive on the right      Neurological Exam:   Mental Status: Awake, alert, oriented to person, place and time.  Conversant without evidence of an affective disorder, thought disorder, delusions or " hallucinations.  Attention span and concentration are normal.  She appears anxious  HCF: No aphasia, apraxia or dysarthria.  Recent and remote memory intact.  Knowledge  of recent events intact.  CN: I:   II: Visual fields full without left inattention   III, IV, VI: Eye movements intact without nystagmus or ptosis.  Pupils equal  round and reactive to light.   V,VII: Light touch and pinprick intact all 3 divisions of V.  Facial muscles symmetrical.   VIII: Hearing intact to finger rub   IX,X: Soft palate elevates symmetrically   XI: Sternomastoid and trapezius are strong.   XII: Tongue midline without atrophy or fasciculations  Motor: Normal tone and bulk in the upper and lower extremities   Power testing: Minor weakness of the abductor pollicis brevis muscles with good strength otherwise in the upper extremities.  In the lower extremities there is mild to moderate weakness of the right iliopsoas and mild weakness of the right quadriceps.  All other muscles tested proximally in the legs were strong.  In the feet there is mild weakness of toe extension and questionable weakness of toe flexion on both sides.  Reflexes: Upper extremities: +1 diffusely        Lower extremities: Left knee jerk +1 right knee jerk trace.  Ankle jerks absent        Toe signs: Downgoing  Sensory: Light touch: Diffusely intact in the upper extremities.  In the lower extremities there is some reduced sensations in the toes        Pinprick: Reduced sensations in the toes and feet        Vibration: Reduced at the ankle        Position: Intact great toes    Cerebellar: Finger-to-nose: Normal           Rapid movement: Normal           Heel-to-shin: Normal on the left but has trouble on the right due to some weakness of the iliopsoas muscle  Gait and Station: Comes to stand gingerly.  Gait looks a little antalgic.    Results:      Lab Results   Component Value Date    GLUCOSE 119 (H) 06/26/2024    BUN 18 06/26/2024    CREATININE 0.74 06/26/2024     "EGFRIFNONA 76 07/30/2018    BCR 24.3 06/26/2024    CO2 25.1 06/26/2024    CALCIUM 8.7 06/26/2024    ALBUMIN 3.4 (L) 06/26/2024    AST 27 06/26/2024    ALT 14 06/26/2024       Lab Results   Component Value Date    WBC 5.33 06/21/2024    HGB 9.5 (L) 06/27/2024    HCT 28.8 (L) 06/27/2024    MCV 87.6 06/21/2024     06/21/2024         .No results found for: \"RPR\"      No results found for: \"TSH\", \"L5RAYQO\", \"R8OTWMA\", \"THYROIDAB\"      No results found for: \"BISFBXPW86\"      No results found for: \"FOLATE\"      Lab Results   Component Value Date    HGBA1C 6.50 (H) 06/21/2024         Lab Results   Component Value Date    GLUCOSE 119 (H) 06/26/2024    BUN 18 06/26/2024    CREATININE 0.74 06/26/2024    EGFRIFNONA 76 07/30/2018    BCR 24.3 06/26/2024    K 4.2 06/26/2024    CO2 25.1 06/26/2024    CALCIUM 8.7 06/26/2024    ALBUMIN 3.4 (L) 06/26/2024    AST 27 06/26/2024    ALT 14 06/26/2024         Lab Results   Component Value Date    WBC 5.33 06/21/2024    HGB 9.5 (L) 06/27/2024    HCT 28.8 (L) 06/27/2024    MCV 87.6 06/21/2024     06/21/2024             Assessment:   1.  Peripheral neuropathy most likely due to diabetes/prediabetes  2.  Superimposed right femoral neuropathy (less likely L3 or L4 radiculopathy based on a lack of back pain up until the last couple of days.  3.  RLS, severe present in the daytime as well as at night  4.  Right hip pain and left knee pain of orthopedic origin        Plan:  1.  Options discussed.  We could consider an MRI of the lumbar spine to clear the lumbar spine to be a cause for an L4 or L3 radiculopathy however she has not had any pain up until the last couple of days.  I doubt it is her back.  2.  Trial of carbidopa/levodopa, 50/200, starting with half tablet 1 hour before bed for about a week and then a whole tablet taken 1 hour before bed.  If she tolerates this okay we can try a small dose in the mid-to-late afternoon when the symptoms get started.  3.  Labs for treatable " causes of peripheral neuropathy as well as RLS including a sed rate, RPR, B12 and folate, thyroid functions, heavy metal screen, SPEP/IEP/cryoglobulins as well as the serum iron level and ferritin level, CBC, CMP and lipid profile.  It appears she has not been on her rosuvastatin for a couple of months though  4.  See Monserrat Ramirez NP in about 1 month  5.  Physical therapy        Time: 90 minutes                Dictated utilizing Dragon dictation.

## 2025-07-02 ENCOUNTER — LAB (OUTPATIENT)
Dept: LAB | Facility: HOSPITAL | Age: 80
End: 2025-07-02
Payer: MEDICARE

## 2025-07-02 DIAGNOSIS — D50.8 OTHER IRON DEFICIENCY ANEMIA: ICD-10-CM

## 2025-07-02 DIAGNOSIS — G60.9 IDIOPATHIC PERIPHERAL NEUROPATHY: ICD-10-CM

## 2025-07-02 DIAGNOSIS — R73.9 HYPERGLYCEMIA, UNSPECIFIED: ICD-10-CM

## 2025-07-02 DIAGNOSIS — E78.5 HYPERLIPIDEMIA, UNSPECIFIED HYPERLIPIDEMIA TYPE: ICD-10-CM

## 2025-07-02 DIAGNOSIS — G25.81 RESTLESS LEGS SYNDROME (RLS): ICD-10-CM

## 2025-07-02 LAB
ALBUMIN SERPL-MCNC: 4.4 G/DL (ref 3.5–5.2)
ALBUMIN/GLOB SERPL: 1.5 G/DL
ALP SERPL-CCNC: 97 U/L (ref 39–117)
ALT SERPL W P-5'-P-CCNC: 17 U/L (ref 1–33)
ANION GAP SERPL CALCULATED.3IONS-SCNC: 12.4 MMOL/L (ref 5–15)
AST SERPL-CCNC: 22 U/L (ref 1–32)
BASOPHILS # BLD AUTO: 0.03 10*3/MM3 (ref 0–0.2)
BASOPHILS NFR BLD AUTO: 0.5 % (ref 0–1.5)
BILIRUB SERPL-MCNC: 0.5 MG/DL (ref 0–1.2)
BUN SERPL-MCNC: 15 MG/DL (ref 8–23)
BUN/CREAT SERPL: 15.5 (ref 7–25)
CALCIUM SPEC-SCNC: 9.7 MG/DL (ref 8.6–10.5)
CHLORIDE SERPL-SCNC: 105 MMOL/L (ref 98–107)
CHOLEST SERPL-MCNC: 287 MG/DL (ref 0–200)
CO2 SERPL-SCNC: 25.6 MMOL/L (ref 22–29)
CREAT SERPL-MCNC: 0.97 MG/DL (ref 0.57–1)
DEPRECATED RDW RBC AUTO: 44.9 FL (ref 37–54)
EGFRCR SERPLBLD CKD-EPI 2021: 59.2 ML/MIN/1.73
EOSINOPHIL # BLD AUTO: 0.07 10*3/MM3 (ref 0–0.4)
EOSINOPHIL NFR BLD AUTO: 1.3 % (ref 0.3–6.2)
ERYTHROCYTE [DISTWIDTH] IN BLOOD BY AUTOMATED COUNT: 14.1 % (ref 12.3–15.4)
ERYTHROCYTE [SEDIMENTATION RATE] IN BLOOD: 17 MM/HR (ref 0–30)
FERRITIN SERPL-MCNC: 219 NG/ML (ref 13–150)
FOLATE SERPL-MCNC: 7.48 NG/ML (ref 4.78–24.2)
GLOBULIN UR ELPH-MCNC: 3 GM/DL
GLUCOSE SERPL-MCNC: 90 MG/DL (ref 65–99)
HBA1C MFR BLD: 6.2 % (ref 4.8–5.6)
HCT VFR BLD AUTO: 39.7 % (ref 34–46.6)
HDLC SERPL-MCNC: 42 MG/DL (ref 40–60)
HGB BLD-MCNC: 13.2 G/DL (ref 12–15.9)
IMM GRANULOCYTES # BLD AUTO: 0.01 10*3/MM3 (ref 0–0.05)
IMM GRANULOCYTES NFR BLD AUTO: 0.2 % (ref 0–0.5)
IRON 24H UR-MRATE: 96 MCG/DL (ref 37–145)
LDLC SERPL CALC-MCNC: 198 MG/DL (ref 0–100)
LDLC/HDLC SERPL: 4.69 {RATIO}
LYMPHOCYTES # BLD AUTO: 2.1 10*3/MM3 (ref 0.7–3.1)
LYMPHOCYTES NFR BLD AUTO: 38.3 % (ref 19.6–45.3)
MCH RBC QN AUTO: 29.6 PG (ref 26.6–33)
MCHC RBC AUTO-ENTMCNC: 33.2 G/DL (ref 31.5–35.7)
MCV RBC AUTO: 89 FL (ref 79–97)
MONOCYTES # BLD AUTO: 0.35 10*3/MM3 (ref 0.1–0.9)
MONOCYTES NFR BLD AUTO: 6.4 % (ref 5–12)
NEUTROPHILS NFR BLD AUTO: 2.92 10*3/MM3 (ref 1.7–7)
NEUTROPHILS NFR BLD AUTO: 53.3 % (ref 42.7–76)
NRBC BLD AUTO-RTO: 0 /100 WBC (ref 0–0.2)
PLATELET # BLD AUTO: 172 10*3/MM3 (ref 140–450)
PMV BLD AUTO: 10.2 FL (ref 6–12)
POTASSIUM SERPL-SCNC: 4.3 MMOL/L (ref 3.5–5.2)
PROT SERPL-MCNC: 7.4 G/DL (ref 6–8.5)
RBC # BLD AUTO: 4.46 10*6/MM3 (ref 3.77–5.28)
SODIUM SERPL-SCNC: 143 MMOL/L (ref 136–145)
T4 FREE SERPL-MCNC: 0.85 NG/DL (ref 0.92–1.68)
TRIGL SERPL-MCNC: 240 MG/DL (ref 0–150)
TSH SERPL DL<=0.05 MIU/L-ACNC: 1.86 UIU/ML (ref 0.27–4.2)
VIT B12 BLD-MCNC: 256 PG/ML (ref 211–946)
VLDLC SERPL-MCNC: 47 MG/DL (ref 5–40)
WBC NRBC COR # BLD AUTO: 5.48 10*3/MM3 (ref 3.4–10.8)

## 2025-07-02 PROCEDURE — 84443 ASSAY THYROID STIM HORMONE: CPT | Performed by: PSYCHIATRY & NEUROLOGY

## 2025-07-02 PROCEDURE — 82784 ASSAY IGA/IGD/IGG/IGM EACH: CPT | Performed by: PSYCHIATRY & NEUROLOGY

## 2025-07-02 PROCEDURE — 85025 COMPLETE CBC W/AUTO DIFF WBC: CPT | Performed by: PSYCHIATRY & NEUROLOGY

## 2025-07-02 PROCEDURE — 83655 ASSAY OF LEAD: CPT | Performed by: PSYCHIATRY & NEUROLOGY

## 2025-07-02 PROCEDURE — 82746 ASSAY OF FOLIC ACID SERUM: CPT | Performed by: PSYCHIATRY & NEUROLOGY

## 2025-07-02 PROCEDURE — 84439 ASSAY OF FREE THYROXINE: CPT | Performed by: PSYCHIATRY & NEUROLOGY

## 2025-07-02 PROCEDURE — 83036 HEMOGLOBIN GLYCOSYLATED A1C: CPT | Performed by: PSYCHIATRY & NEUROLOGY

## 2025-07-02 PROCEDURE — 82607 VITAMIN B-12: CPT | Performed by: PSYCHIATRY & NEUROLOGY

## 2025-07-02 PROCEDURE — 82595 ASSAY OF CRYOGLOBULIN: CPT | Performed by: PSYCHIATRY & NEUROLOGY

## 2025-07-02 PROCEDURE — 84165 PROTEIN E-PHORESIS SERUM: CPT | Performed by: PSYCHIATRY & NEUROLOGY

## 2025-07-02 PROCEDURE — 80061 LIPID PANEL: CPT

## 2025-07-02 PROCEDURE — 80053 COMPREHEN METABOLIC PANEL: CPT | Performed by: PSYCHIATRY & NEUROLOGY

## 2025-07-02 PROCEDURE — 83540 ASSAY OF IRON: CPT

## 2025-07-02 PROCEDURE — 82728 ASSAY OF FERRITIN: CPT

## 2025-07-02 PROCEDURE — 82525 ASSAY OF COPPER: CPT | Performed by: PSYCHIATRY & NEUROLOGY

## 2025-07-02 PROCEDURE — 85652 RBC SED RATE AUTOMATED: CPT | Performed by: PSYCHIATRY & NEUROLOGY

## 2025-07-02 PROCEDURE — 86592 SYPHILIS TEST NON-TREP QUAL: CPT | Performed by: PSYCHIATRY & NEUROLOGY

## 2025-07-02 PROCEDURE — 86334 IMMUNOFIX E-PHORESIS SERUM: CPT | Performed by: PSYCHIATRY & NEUROLOGY

## 2025-07-02 PROCEDURE — 83825 ASSAY OF MERCURY: CPT | Performed by: PSYCHIATRY & NEUROLOGY

## 2025-07-02 PROCEDURE — 36415 COLL VENOUS BLD VENIPUNCTURE: CPT | Performed by: PSYCHIATRY & NEUROLOGY

## 2025-07-02 PROCEDURE — 82175 ASSAY OF ARSENIC: CPT | Performed by: PSYCHIATRY & NEUROLOGY

## 2025-07-03 LAB
ALBUMIN SERPL ELPH-MCNC: 3.9 G/DL (ref 2.9–4.4)
ALBUMIN/GLOB SERPL: 1.2 {RATIO} (ref 0.7–1.7)
ALPHA1 GLOB SERPL ELPH-MCNC: 0.2 G/DL (ref 0–0.4)
ALPHA2 GLOB SERPL ELPH-MCNC: 0.8 G/DL (ref 0.4–1)
B-GLOBULIN SERPL ELPH-MCNC: 1.2 G/DL (ref 0.7–1.3)
GAMMA GLOB SERPL ELPH-MCNC: 1 G/DL (ref 0.4–1.8)
GLOBULIN SER CALC-MCNC: 3.2 G/DL (ref 2.2–3.9)
LABORATORY COMMENT REPORT: NORMAL
M PROTEIN SERPL ELPH-MCNC: NORMAL G/DL
PROT PATTERN SERPL ELPH-IMP: NORMAL
PROT SERPL-MCNC: 7.1 G/DL (ref 6–8.5)
RPR SER QL: NORMAL

## 2025-07-04 LAB
IGA SERPL-MCNC: 281 MG/DL (ref 64–422)
IGG SERPL-MCNC: 1154 MG/DL (ref 586–1602)
IGM SERPL-MCNC: 53 MG/DL (ref 26–217)
PROT PATTERN SERPL IFE-IMP: NORMAL

## 2025-07-07 LAB
COPPER SERPL-MCNC: 101 UG/DL (ref 80–158)
CRYOGLOB SER QL 1D COLD INC: NORMAL

## 2025-07-11 LAB
ARSENIC BLD-MCNC: 2 UG/L (ref 0–9)
LEAD BLDV-MCNC: <1 UG/DL (ref 0–3.4)
MERCURY BLD-MCNC: <1 UG/L (ref 0–14.9)

## 2025-07-29 NOTE — PROGRESS NOTES
"NAME: FREDY Rivera   : 1945    Chief Complaint   Patient presents with    Idiopathic peripheral neuropathy        HPI:  FREDY Rivera is a 80 y.o. right-handed female who I am seeing for the first time in follow-up regarding restless leg syndrome and peripheral neuropathy.  She is accompanied by her  who adds to the history.  She was last seen by Dr. Francisco on 2025, with the following history taken from that note with additions/modifications as indicated:    The patient had previously been seen by Dr. Kevyn Wasserman in the [facility] system, most recently in 2016 for restless leg syndrome. She was being treated with Mirapex up to at least 0.5 mg 3 times daily. The patient states it caused her to be excessively drowsy and she stopped it. She has also been tried on gabapentin and apparently Requip. These all cause excessive sedation. She has to take it in the late afternoon as well as at night because it starts in the late afternoon. She has not been on Sinemet.     The patient had a right total hip replacement in 2024. About 3 weeks afterwards she still had postoperative pain she fell onto her knees and shins and has had persistent right hip and thigh pain. MRI of the right hip demonstrated no issues. She was to go to physical therapy again but did not go because she did not feel she would benefit since she cannot raise her leg up at the hip. She was sent for a neuro consult with Dr. Jose Bettencourt who did an EMG 10/18/2024 and 2024 involving the lower extremities.  She was diagnosed with peripheral neuropathy and a right femoral neuropathy.    The patient states that she does have some pain in the right groin, hip and leg with weightbearing and some pain in the left knee with weightbearing.  She stated the left knee was bone-on-bone.  She states when she first gets up she feels \"frozen\". After walking a short distance her legs seem to limber up. She complains that both legs feel heavy. " She has some numbness in the feet. She denies history of smoking or alcohol use. She has history of anemia of iron deficiency. She has not been having low back pain, until the last couple of days, she has had some back pain. She describes some cramping in the feet.     History interim    Patient here to follow-up on labs for treatable causes of peripheral neuropathy.  Vitamin B12 low normal 256.  Hemoglobin A1c 6.2% consistent with prediabetes.  TSH 1.860, free T4 0.85 most likely due to sick euthyroid state.  Lipid panel total cholesterol 287, HDL 42,  and triglycerides 240.  Ferritin elevated at 219 and iron normal 96.  Copper 101, iron 96, sed rate 17 normal.  Cryoglobulins not detected, heavy metals negative, no MGUS and RPR nonreactive.  CBC and CMP unremarkable.    Patient reports improvement of RLS on Carbidopa/levodopa 50/200 mg 1 tablet 30 to 40 minutes prior to bedtime.  She reports some restless movements but states it does not last long.  She reports some symptoms started around 6 PM but they are not bothersome.  She continues to report her legs feeling heavy when she is walking to the bathroom in the [place] or first thing in the morning.  She states it might be a little worse after starting carbidopa/levodopa and states she has to hold onto the walls little more.  She denies any falls.    She reports numbness in her fingers and feet.  She feels like her feet are wrapped in tape.  She denies any weakness in her hands but states occasionally her knees will give out.  She denies any neck pain.  She reports low back pain that is worse if she bends over.  She denies any radicular pain down either leg.  She reports urinary and bowel urgency with occasional incontinence.  She states she wears a pad.    She states physical therapy call to schedule her appointment, but they called at at bad time because she was about to put her dog down.  She states when she tried to return her call went to [facility]  and she has not heard back.    Past Medical History:   Diagnosis Date    Elevated cholesterol     Restless leg syndrome     Skin cancer          Past Surgical History:   Procedure Laterality Date    BREAST SURGERY      BREAST IMPLANTS    CHOLECYSTECTOMY      COLONOSCOPY N/A 04/28/2017    Procedure: COLONOSCOPY;  Surgeon: Gayathri Yates MD;  Location: [facility] ENDOSCOPY;  Service:     COLONOSCOPY N/A 04/25/2022    Procedure: COLONOSCOPY;  Surgeon: Gayathri Yates MD;  Location: [facility] [place];  Service: Gastroenterology;  Laterality: N/A;  polyps, diverticulosis    SKIN CANCER EXCISION      TONSILLECTOMY      TOTAL HIP ARTHROPLASTY Right 6/24/2024    Procedure: TOTAL HIP ARTHROPLASTY ANTERIOR WITH HANA TABLE;  Surgeon: Sheng Zamora II, MD;  [place]:  [facility] [place];  Service: Orthopedics;  Laterality: Right;    TUBAL ABDOMINAL LIGATION             Current Outpatient Medications:     carbidopa-levodopa CR (SINEMET CR)  MG per CR tablet, 1/2 tab 1 hour prior to bedtime for 1 week then 1 tab 1 hour prior to bedtime (Patient taking differently: Take 1 tablet by mouth Every Night.), Disp: 30 tablet, Rfl: 3    Ferrous Sulfate 300 (60 Fe) MG/5ML solution, Take 5 mL by mouth Daily., Disp: , Rfl:     rosuvastatin (CRESTOR) 40 MG tablet, Take 1 tablet by mouth Daily., Disp: , Rfl:     VITAMIN D, CHOLECALCIFEROL, PO, Take  by mouth., Disp: , Rfl:     diazePAM (Valium) 5 MG tablet, Take 1 tablet by mouth Take As Directed. Take 1 tablet 1 hour before MRI, may take 2nd tablet right before MRI if needed. Do not drive., Disp: 2 tablet, Rfl: 0      Family History   Problem Relation Age of Onset    Colon cancer Mother     Malig Hyperthermia Neg Hx          Social History     Socioeconomic History    Marital status: Other   Tobacco Use    Smoking status: Never     Passive exposure: Never    Smokeless tobacco: Never   Vaping Use    Vaping status: Never Used   Substance and Sexual Activity    Alcohol use: No     "Drug use: No    Sexual activity: Defer         No Known Allergies      Pain Scale: 0/10 currently         ROS:  Review of Systems   Musculoskeletal:  Positive for gait problem.   Neurological:  Positive for weakness, numbness and headaches. Negative for dizziness, tremors, seizures, syncope, facial asymmetry, speech difficulty and light-headedness.   Psychiatric/Behavioral:  Positive for agitation. Negative for behavioral problems, confusion, decreased concentration, dysphoric mood, hallucinations, self-injury, sleep disturbance and suicidal ideas. The patient is nervous/anxious. The patient is not hyperactive.        I have reviewed and agree with the above ROS complete by medical assistant.    Physical Exam:  Vitals:    07/31/25 1405   BP: 122/60   Pulse: 66   SpO2: 96%   Weight: 86.2 kg (190 lb)   Height: 170.2 cm (67.01\")       Orthostatic BP:       Physical Exam  General: Overweight white female no acute distress  HEENT: Normocephalic no evidence of trauma  Neck: Supple.    Heart: Regular rate and rhythm  Extremities: Radial pulses strong and simultaneous.  No pedal edema.      Neurological Exam:   Mental Status: Awake, alert, oriented to person, place and time.  Conversant without evidence of an affective disorder, thought disorder, delusions or hallucinations.  Attention span and concentration are normal.  HCF: No aphasia, apraxia or dysarthria.  Recent and remote memory intact.  Knowledge of recent events intact.  CN: I:   II: Visual fields full without left inattention   III, IV, VI: Eye movements intact without nystagmus or ptosis.  Pupils equal round and reactive to light.   V,VII: Light touch and pinprick intact all 3 divisions of V.  Facial muscles symmetrical.   VIII: Hearing intact to finger rub   IX,X: Soft palate elevates symmetrically   XI: Sternomastoid and trapezius are strong.   XII: Tongue midline without atrophy or fasciculations    Motor: Normal tone and bulk in the upper and lower " extremities.    Power testing: Minimal weakness of APB muscles bilaterally but pretty good strength in all muscles tested in the upper extremities.  There is mild to moderate weakness of right iliopsoas and mild weakness of right quadriceps.  There is mild weakness of toe flexors and extensors bilaterally.    Reflexes: Upper extremities: +1 diffusely        Lower extremities: Trace knee jerks absent ankle jerk        Toe signs: Downgoing.  Very ticklish        Clonus:     Sensory: Light touch: Diffusely intact in the arms and legs        Pinprick: Diffusely intact in the arms and left leg reduced on the right ankle and feet/toes        Vibration: Reduced at the ankles        Position:        Temperature:    Cerebellar: Finger-to-nose: Normal           Rapid movement: Normal           Heel-to-shin:    Gait and Station: Comes to stand pushing up the chair.  Antalgic gait.  Able to raise on toes and heels with assist.  Slight swaying with Romberg.  No drift.      Results:    Lab Results   Component Value Date    GLUCOSE 90 07/02/2025    BUN 15.0 07/02/2025    CREATININE 0.97 07/02/2025    EGFRIFNONA 76 07/30/2018    BCR 15.5 07/02/2025    CO2 25.6 07/02/2025    CALCIUM 9.7 07/02/2025    ALBUMIN 3.9 07/02/2025    ALBUMIN 4.4 07/02/2025    AST 22 07/02/2025    ALT 17 07/02/2025     Lab Results   Component Value Date    WBC 5.48 07/02/2025    HGB 13.2 07/02/2025    HCT 39.7 07/02/2025    MCV 89.0 07/02/2025     07/02/2025     Lab Results   Component Value Date    RPR Non-Reactive 07/02/2025     Lab Results   Component Value Date    TSH 1.860 07/02/2025     Lab Results   Component Value Date    PXLVPIRV05 256 07/02/2025     Lab Results   Component Value Date    FOLATE 7.48 07/02/2025     Lab Results   Component Value Date    HGBA1C 6.20 (H) 07/02/2025     Lab Results   Component Value Date    ARSENIC 2 07/02/2025     Lab Results   Component Value Date    MERCURY <1.0 07/02/2025     Lab Results   Component Value Date     COPPER 101 07/02/2025     Lab Results   Component Value Date    SEDRATE 17 07/02/2025         Assessment:    1.  Peripheral neuropathy-most likely due to prediabetes.  Low normal B12.  Discussed starting a B12 supplement.  2.  Superimposed right femoral neuropathy  3.  Lumbar radiculopathy-patient reports low back pain but denies any radicular pain down either leg.  She reports worsening pain bending over.  4.  RLS-well-controlled on carbidopa/levodopa           Assessment and Plan   Diagnoses and all orders for this visit:    1. Idiopathic peripheral neuropathy (Primary)  -     Ambulatory Referral to Physical Therapy for Evaluation & Treatment    2. Restless legs syndrome (RLS)    3. Femoral neuropathy of right lower extremity  -     Ambulatory Referral to Physical Therapy for Evaluation & Treatment    4. Lumbar radiculopathy  -     MRI Lumbar Spine Without Contrast; Future    5. Phobia, unspecified type  -     diazePAM (Valium) 5 MG tablet; Take 1 tablet by mouth Take As Directed. Take 1 tablet 1 hour before MRI, may take 2nd tablet right before MRI if needed. Do not drive.  Dispense: 2 tablet; Refill: 0        Ideal targets for risk factor control would be Blood pressure < 130/80, B12 > 500, TSH in normal range and LDL < 70; HbA1c < 6.5 and smoking cessation if applicable. Call 911 for stroke symptoms.  Recommend 150 minutes of physical activity weekly.  Limit alcohol intake.  Continue carbidopa/levodopa 50/200 mg 1 tablet an hour before bed  MRI lumbar spine to rule out L3 or L4 radiculopathy.  Patient extremely claustrophobic and needs an open MRI as well as Valium  Schedule physical therapy  Discussed with patient restarting rosuvastatin to lower her CV risk  Start taking vitamin B12 supplement daily  Follow-up in 6 to 8 weeks or sooner if needed      Time: Spent 70 minutes in total encounter time. This included time for chart review, obtaining history, performing pertinent physical examination, updating  medical information, ordering tests/referrals, discussion of diagnosis, medical management, counseling, and encounter documentation.        PRIYANKA Hoffmann          Much of this encounter note was dictated utilizing Dragon dictation which is an electronic transcription/translation of spoken language to printed text. The electronic translation of spoken language may permit erroneous, or at times, nonsensical words or phrases to be inadvertently transcribed; Although I have reviewed the note for such errors, some may still exist.    Portions of this assessment have been copied from previous documentation which has been thoroughly reviewed and updated as appropriate.

## 2025-07-31 ENCOUNTER — OFFICE VISIT (OUTPATIENT)
Dept: NEUROLOGY | Facility: CLINIC | Age: 80
End: 2025-07-31
Payer: MEDICARE

## 2025-07-31 VITALS
WEIGHT: 190 LBS | HEART RATE: 66 BPM | DIASTOLIC BLOOD PRESSURE: 60 MMHG | BODY MASS INDEX: 29.82 KG/M2 | OXYGEN SATURATION: 96 % | HEIGHT: 67 IN | SYSTOLIC BLOOD PRESSURE: 122 MMHG

## 2025-07-31 DIAGNOSIS — F40.9 PHOBIA, UNSPECIFIED TYPE: ICD-10-CM

## 2025-07-31 DIAGNOSIS — M54.16 LUMBAR RADICULOPATHY: ICD-10-CM

## 2025-07-31 DIAGNOSIS — G60.9 IDIOPATHIC PERIPHERAL NEUROPATHY: Primary | ICD-10-CM

## 2025-07-31 DIAGNOSIS — G25.81 RESTLESS LEGS SYNDROME (RLS): ICD-10-CM

## 2025-07-31 DIAGNOSIS — G57.21 FEMORAL NEUROPATHY OF RIGHT LOWER EXTREMITY: ICD-10-CM

## 2025-07-31 RX ORDER — DIAZEPAM 5 MG/1
5 TABLET ORAL TAKE AS DIRECTED
Qty: 2 TABLET | Refills: 0 | Status: SHIPPED | OUTPATIENT
Start: 2025-07-31

## 2025-07-31 RX ORDER — FERROUS SULFATE 300 MG/5ML
300 LIQUID (ML) ORAL DAILY
COMMUNITY

## 2025-08-08 ENCOUNTER — HOSPITAL ENCOUNTER (OUTPATIENT)
Dept: PHYSICAL THERAPY | Facility: HOSPITAL | Age: 80
Setting detail: THERAPIES SERIES
Discharge: HOME | End: 2025-08-08
Payer: MEDICARE

## 2025-08-08 DIAGNOSIS — R29.898 WEAKNESS OF RIGHT HIP: ICD-10-CM

## 2025-08-08 DIAGNOSIS — R26.9 ABNORMAL GAIT: ICD-10-CM

## 2025-08-08 DIAGNOSIS — M25.551 RIGHT HIP PAIN: Primary | ICD-10-CM

## 2025-08-08 PROCEDURE — 97161 PT EVAL LOW COMPLEX 20 MIN: CPT

## 2025-08-08 PROCEDURE — 97110 THERAPEUTIC EXERCISES: CPT

## 2025-08-15 DIAGNOSIS — M54.16 LUMBAR RADICULOPATHY: ICD-10-CM

## 2025-08-19 ENCOUNTER — TELEPHONE (OUTPATIENT)
Dept: NEUROLOGY | Facility: CLINIC | Age: 80
End: 2025-08-19
Payer: MEDICARE

## 2025-08-19 DIAGNOSIS — M48.061 SPINAL STENOSIS OF LUMBAR REGION, UNSPECIFIED WHETHER NEUROGENIC CLAUDICATION PRESENT: ICD-10-CM

## 2025-08-19 DIAGNOSIS — M54.16 LUMBAR RADICULOPATHY: Primary | ICD-10-CM

## 2025-08-20 ENCOUNTER — HOSPITAL ENCOUNTER (OUTPATIENT)
Dept: PHYSICAL THERAPY | Facility: HOSPITAL | Age: 80
Setting detail: THERAPIES SERIES
Discharge: HOME | End: 2025-08-20
Payer: MEDICARE

## 2025-08-20 DIAGNOSIS — R29.898 WEAKNESS OF RIGHT HIP: ICD-10-CM

## 2025-08-20 DIAGNOSIS — R26.9 ABNORMAL GAIT: ICD-10-CM

## 2025-08-20 DIAGNOSIS — M25.551 RIGHT HIP PAIN: Primary | ICD-10-CM

## 2025-08-20 PROCEDURE — 97110 THERAPEUTIC EXERCISES: CPT

## 2025-08-28 ENCOUNTER — HOSPITAL ENCOUNTER (OUTPATIENT)
Dept: PHYSICAL THERAPY | Facility: HOSPITAL | Age: 80
Setting detail: THERAPIES SERIES
Discharge: HOME | End: 2025-08-28
Payer: MEDICARE

## 2025-08-28 DIAGNOSIS — R29.898 WEAKNESS OF RIGHT HIP: ICD-10-CM

## 2025-08-28 DIAGNOSIS — R26.9 ABNORMAL GAIT: ICD-10-CM

## 2025-08-28 DIAGNOSIS — M25.551 RIGHT HIP PAIN: Primary | ICD-10-CM

## 2025-08-28 PROCEDURE — 97110 THERAPEUTIC EXERCISES: CPT

## (undated) DEVICE — PREP IM ENCHANCED TOTAL HIP BONE                                    PREPARATION KIT: Brand: PREP-IM

## (undated) DEVICE — COAXIAL FEMORAL CANAL TIP

## (undated) DEVICE — PENCL SMOKE/EVAC MEGADYNE TELESCP 10FT

## (undated) DEVICE — GLV SURG PREMIERPRO ORTHO LTX PF SZ8.5 BRN

## (undated) DEVICE — APPL CHLORAPREP HI/LITE 26ML ORNG

## (undated) DEVICE — RECIPROCATING BLADE HEAVY DUTY LONG, OFFSET  (77.6 X 0.77 X 11.2MM)

## (undated) DEVICE — 3M™ IOBAN™ 2 ANTIMICROBIAL INCISE DRAPE 6640EZ: Brand: IOBAN™ 2

## (undated) DEVICE — GLV SURG SIGNATURE ESSENTIAL PF LTX SZ8.5

## (undated) DEVICE — TRAP FLD MINIVAC MEGADYNE 100ML

## (undated) DEVICE — DRSNG BURN ACTICOAT FLEX 7 1X24IN

## (undated) DEVICE — SOL NACL 0.9PCT 100ML SGL

## (undated) DEVICE — SUT ETHIB 2 CV V37 MS/4 30IN MX69G

## (undated) DEVICE — PK ANT HIP 40

## (undated) DEVICE — MAT FLR ABSORBENT LG 4FT 10 2.5FT

## (undated) DEVICE — 450 ML BOTTLE OF 0.05% CHLORHEXIDINE GLUCONATE IN 99.95% STERILE WATER FOR IRRIGATION, USP AND APPLICATOR.: Brand: IRRISEPT ANTIMICROBIAL WOUND LAVAGE

## (undated) DEVICE — NEEDLE, QUINCKE, 20GX3.5": Brand: MEDLINE

## (undated) DEVICE — ZIP 16 SURGICAL SKIN CLOSURE DEVICE, PSA: Brand: ZIP 16 SURGICAL SKIN CLOSURE DEVICE

## (undated) DEVICE — PATIENT RETURN ELECTRODE, SINGLE-USE, CONTACT QUALITY MONITORING, ADULT, WITH 9FT CORD, FOR PATIENTS WEIGING OVER 33LBS. (15KG): Brand: MEGADYNE

## (undated) DEVICE — SOL ISO/ALC 70PCT 4OZ

## (undated) DEVICE — SYR LUERLOK 30CC

## (undated) DEVICE — THE STERILE LIGHT HANDLE COVER IS USED WITH STERIS SURGICAL LIGHTING AND VISUALIZATION SYSTEMS.